# Patient Record
Sex: FEMALE | Race: WHITE | Employment: OTHER | URBAN - METROPOLITAN AREA
[De-identification: names, ages, dates, MRNs, and addresses within clinical notes are randomized per-mention and may not be internally consistent; named-entity substitution may affect disease eponyms.]

---

## 2017-01-12 ENCOUNTER — HOSPITAL ENCOUNTER (OUTPATIENT)
Dept: INFUSION THERAPY | Age: 67
Discharge: HOME | End: 2017-01-12
Payer: COMMERCIAL

## 2017-01-12 VITALS
RESPIRATION RATE: 16 BRPM | DIASTOLIC BLOOD PRESSURE: 72 MMHG | SYSTOLIC BLOOD PRESSURE: 118 MMHG | HEART RATE: 72 BPM | TEMPERATURE: 97.8 F

## 2017-01-12 PROCEDURE — 74011250636 HC RX REV CODE- 250/636

## 2017-01-12 RX ADMIN — OMALIZUMAB 150 MG: 202.5 INJECTION, SOLUTION SUBCUTANEOUS at 16:10

## 2017-01-12 NOTE — PROGRESS NOTES
[facility] Outpatient Infusion Center Note:  1600Pt arrived at [facility] ambulatory and in no distress for xolair    Assessment stable, no new complaints voiced. Pt has epi pen. Pt states she is feeling well. Medications received:  xolzir    1640 Tolerated treatment well, no adverse reaction noted. D/Cd from [facility] ambulatory and in no distress accompanied by self. Next appt 1/26  1600  Visit Vitals    /72    Pulse 72    Temp 97.8 °F (36.6 °C)    Resp 16     No results found for this or any previous visit (from the past 12 hour(s)).

## 2017-01-26 ENCOUNTER — HOSPITAL ENCOUNTER (OUTPATIENT)
Dept: INFUSION THERAPY | Age: 67
Discharge: HOME | End: 2017-01-26
Payer: COMMERCIAL

## 2017-01-26 VITALS
HEART RATE: 67 BPM | OXYGEN SATURATION: 95 % | DIASTOLIC BLOOD PRESSURE: 61 MMHG | TEMPERATURE: 98.9 F | SYSTOLIC BLOOD PRESSURE: 121 MMHG | RESPIRATION RATE: 16 BRPM

## 2017-01-26 PROCEDURE — 74011250636 HC RX REV CODE- 250/636

## 2017-01-26 PROCEDURE — 96401 CHEMO ANTI-NEOPL SQ/IM: CPT

## 2017-01-26 RX ADMIN — OMALIZUMAB 150 MG: 202.5 INJECTION, SOLUTION SUBCUTANEOUS at 16:31

## 2017-01-26 NOTE — PROGRESS NOTES
Outpatient Infusion Center Progress Note    9921 Pt admit to [facility] for Xolair injections. Pt ambulatory in stable condition. Assessment completed. No new concerns voiced. Visit Vitals    /61    Pulse 67    Temp 98.9 °F (37.2 °C)    Resp 16    SpO2 95%    Breastfeeding No       Medications:  Xolair-SQ-abdomen    1650 Pt tolerated treatment well. D/c home ambulatory in no distress. Pt aware of next appointment scheduled for 02/09/17.

## 2017-02-02 ENCOUNTER — HOSPITAL ENCOUNTER (OUTPATIENT)
Dept: MRI IMAGING | Age: 67
Discharge: HOME | End: 2017-02-02
Attending: SPECIALIST
Payer: COMMERCIAL

## 2017-02-02 DIAGNOSIS — K22.4 MOTILITY DISORDER, ESOPHAGEAL: ICD-10-CM

## 2017-02-02 DIAGNOSIS — Z86.010 PERSONAL HISTORY OF COLONIC POLYPS: ICD-10-CM

## 2017-02-02 DIAGNOSIS — Z80.0 FAMILY HISTORY OF MALIGNANT NEOPLASM OF GASTROINTESTINAL TRACT: ICD-10-CM

## 2017-02-02 DIAGNOSIS — K59.00 CONSTIPATION: ICD-10-CM

## 2017-02-02 DIAGNOSIS — R15.0 INCOMPLETE DEFECATION: ICD-10-CM

## 2017-02-02 PROCEDURE — 72195 MRI PELVIS W/O DYE: CPT

## 2017-02-09 ENCOUNTER — HOSPITAL ENCOUNTER (OUTPATIENT)
Dept: INFUSION THERAPY | Age: 67
Discharge: HOME | End: 2017-02-09
Payer: COMMERCIAL

## 2017-02-09 VITALS
DIASTOLIC BLOOD PRESSURE: 77 MMHG | SYSTOLIC BLOOD PRESSURE: 132 MMHG | HEART RATE: 68 BPM | RESPIRATION RATE: 16 BRPM | TEMPERATURE: 97.9 F

## 2017-02-09 PROCEDURE — 74011250636 HC RX REV CODE- 250/636

## 2017-02-09 PROCEDURE — 96401 CHEMO ANTI-NEOPL SQ/IM: CPT

## 2017-02-09 RX ADMIN — OMALIZUMAB 150 MG: 202.5 INJECTION, SOLUTION SUBCUTANEOUS at 17:02

## 2017-02-09 NOTE — PROGRESS NOTES
Outpatient Infusion Center - Chemotherapy Progress Note    1630 Pt admit to [facility] for [address] ambulatory in stable condition. Assessment completed. No new concerns voiced. Visit Vitals    /77    Pulse 68    Temp 97.9 °F (36.6 °C)    Resp 16       Medications:  Xolair (SQ x3 syringes, abd)    1730 Pt tolerated treatment well. D/c home ambulatory in no distress. Pt aware of next [facility] appointment scheduled for 2/23/2017.

## 2017-02-23 ENCOUNTER — HOSPITAL ENCOUNTER (OUTPATIENT)
Dept: INFUSION THERAPY | Age: 67
Discharge: HOME | End: 2017-02-23
Payer: COMMERCIAL

## 2017-02-23 VITALS
RESPIRATION RATE: 18 BRPM | TEMPERATURE: 98.3 F | DIASTOLIC BLOOD PRESSURE: 68 MMHG | HEART RATE: 64 BPM | SYSTOLIC BLOOD PRESSURE: 115 MMHG

## 2017-02-23 PROCEDURE — 74011250636 HC RX REV CODE- 250/636

## 2017-02-23 PROCEDURE — 96401 CHEMO ANTI-NEOPL SQ/IM: CPT

## 2017-02-23 RX ADMIN — OMALIZUMAB 150 MG: 202.5 INJECTION, SOLUTION SUBCUTANEOUS at 16:19

## 2017-02-23 RX ADMIN — OMALIZUMAB 150 MG: 202.5 INJECTION, SOLUTION SUBCUTANEOUS at 16:20

## 2017-02-23 RX ADMIN — OMALIZUMAB 150 MG: 202.5 INJECTION, SOLUTION SUBCUTANEOUS at 17:02

## 2017-02-23 NOTE — PROGRESS NOTES
[facility] Outpatient Infusion Center Note:  1500Pt arrived at [facility] ambulatory and in no distress for   q 2 week xolair  Assessment stable, no new complaints voiced. Medications received:  xolair 450mg     Tolerated treatment well, no adverse reaction noted. D/Cd from [facility] ambulatory and in no distress accompanied by self. .  Next appt 3/9  1500  Visit Vitals    /68    Pulse 64    Temp 98.3 °F (36.8 °C)    Resp 18     No results found for this or any previous visit (from the past 12 hour(s)).

## 2017-03-09 ENCOUNTER — HOSPITAL ENCOUNTER (OUTPATIENT)
Dept: INFUSION THERAPY | Age: 67
Discharge: HOME | End: 2017-03-09
Payer: COMMERCIAL

## 2017-03-23 ENCOUNTER — HOSPITAL ENCOUNTER (OUTPATIENT)
Dept: INFUSION THERAPY | Age: 67
Discharge: HOME | End: 2017-03-23
Payer: COMMERCIAL

## 2017-03-23 VITALS
OXYGEN SATURATION: 96 % | TEMPERATURE: 97.2 F | HEART RATE: 72 BPM | SYSTOLIC BLOOD PRESSURE: 119 MMHG | DIASTOLIC BLOOD PRESSURE: 65 MMHG | RESPIRATION RATE: 18 BRPM

## 2017-03-23 PROCEDURE — 96401 CHEMO ANTI-NEOPL SQ/IM: CPT

## 2017-03-23 PROCEDURE — 74011250636 HC RX REV CODE- 250/636

## 2017-03-23 RX ADMIN — OMALIZUMAB 150 MG: 202.5 INJECTION, SOLUTION SUBCUTANEOUS at 14:42

## 2017-03-23 NOTE — PROGRESS NOTES
Outpatient Infusion Center Short Visit Progress Note    9752 Pt admit to [address] [place] [address] for q 2 week Xolair injections ambulatory in stable condition. Assessment completed. No new concerns voiced. Visit Vitals    /65 (BP 1 Location: Left arm, BP Patient Position: Sitting)    Pulse 72    Temp 97.2 °F (36.2 °C)    Resp 18    SpO2 96%    Breastfeeding No       Medications:  Xolair SQ x3 injections to abdomen    1505 Pt tolerated treatment well. D/c home ambulatory in no distress. Pt aware of next appointment scheduled for 4/6/17 at 3:00 for q 2 week Xolair injections.

## 2017-04-06 ENCOUNTER — HOSPITAL ENCOUNTER (OUTPATIENT)
Dept: INFUSION THERAPY | Age: 67
Discharge: HOME | End: 2017-04-06
Payer: COMMERCIAL

## 2017-04-06 VITALS
SYSTOLIC BLOOD PRESSURE: 118 MMHG | OXYGEN SATURATION: 94 % | HEART RATE: 74 BPM | RESPIRATION RATE: 16 BRPM | DIASTOLIC BLOOD PRESSURE: 64 MMHG | TEMPERATURE: 98.4 F

## 2017-04-06 PROCEDURE — 96401 CHEMO ANTI-NEOPL SQ/IM: CPT

## 2017-04-06 PROCEDURE — 74011250636 HC RX REV CODE- 250/636

## 2017-04-06 RX ADMIN — OMALIZUMAB 150 MG: 202.5 INJECTION, SOLUTION SUBCUTANEOUS at 15:37

## 2017-04-06 RX ADMIN — OMALIZUMAB 150 MG: 202.5 INJECTION, SOLUTION SUBCUTANEOUS at 15:39

## 2017-04-06 RX ADMIN — OMALIZUMAB 150 MG: 202.5 INJECTION, SOLUTION SUBCUTANEOUS at 15:40

## 2017-04-20 ENCOUNTER — HOSPITAL ENCOUNTER (OUTPATIENT)
Dept: INFUSION THERAPY | Age: 67
Discharge: HOME | End: 2017-04-20
Payer: COMMERCIAL

## 2017-04-20 VITALS
SYSTOLIC BLOOD PRESSURE: 110 MMHG | DIASTOLIC BLOOD PRESSURE: 62 MMHG | OXYGEN SATURATION: 96 % | RESPIRATION RATE: 16 BRPM | TEMPERATURE: 97.8 F | HEART RATE: 72 BPM

## 2017-04-20 PROCEDURE — 96401 CHEMO ANTI-NEOPL SQ/IM: CPT

## 2017-04-20 PROCEDURE — 74011250636 HC RX REV CODE- 250/636

## 2017-04-20 RX ADMIN — OMALIZUMAB 150 MG: 202.5 INJECTION, SOLUTION SUBCUTANEOUS at 16:25

## 2017-04-20 NOTE — PROGRESS NOTES
1510 Pt admit to [facility] for Q 2 week Xolair injections ambulatory in stable condition. Assessment completed. Continues to have ESPINAL and chronic back pain. No new concerns voiced. Epi pen present with patient. Visit Vitals    /62 (BP 1 Location: Right arm, BP Patient Position: Sitting)    Pulse 72    Temp 97.8 °F (36.6 °C)    Resp 16    SpO2 96%       Medications:  Xolair 450 MG SQ in 3 divided abdominal injections    1650 Pt tolerated treatment well, monitored X 20 minutes post injection. D/c home ambulatory in no distress. Pt aware of next appointment scheduled for 5/4/17.

## 2017-05-04 ENCOUNTER — HOSPITAL ENCOUNTER (OUTPATIENT)
Dept: INFUSION THERAPY | Age: 67
Discharge: HOME | End: 2017-05-04
Payer: COMMERCIAL

## 2017-05-04 VITALS
SYSTOLIC BLOOD PRESSURE: 101 MMHG | TEMPERATURE: 98 F | RESPIRATION RATE: 16 BRPM | DIASTOLIC BLOOD PRESSURE: 63 MMHG | HEART RATE: 74 BPM

## 2017-05-04 PROCEDURE — 74011250636 HC RX REV CODE- 250/636

## 2017-05-04 PROCEDURE — 96401 CHEMO ANTI-NEOPL SQ/IM: CPT

## 2017-05-04 RX ADMIN — OMALIZUMAB 150 MG: 202.5 INJECTION, SOLUTION SUBCUTANEOUS at 16:27

## 2017-05-04 NOTE — PROGRESS NOTES
Outpatient Infusion Center Short Visit Progress Note    4970 Pt admit to [facility] for Xolair ambulatory in stable condition. Assessment completed. No new concerns voiced. Patient has epi pen with them. Visit Vitals    /63    Pulse 74    Temp 98 °F (36.7 °C)    Resp 16    Breastfeeding No         Medications:  Xolair SQx3 abd     1650 Pt tolerated treatment well. D/c home ambulatory in no distress. Patient stayed 20 minutes to be monitored. Pt aware of next appointment scheduled for 5/8/17.

## 2017-05-18 ENCOUNTER — HOSPITAL ENCOUNTER (OUTPATIENT)
Dept: INFUSION THERAPY | Age: 67
Discharge: HOME | End: 2017-05-18
Payer: COMMERCIAL

## 2017-05-18 VITALS
OXYGEN SATURATION: 97 % | SYSTOLIC BLOOD PRESSURE: 112 MMHG | RESPIRATION RATE: 18 BRPM | HEART RATE: 71 BPM | DIASTOLIC BLOOD PRESSURE: 70 MMHG | TEMPERATURE: 98.1 F

## 2017-05-18 PROCEDURE — 96372 THER/PROPH/DIAG INJ SC/IM: CPT

## 2017-05-18 PROCEDURE — 74011250636 HC RX REV CODE- 250/636

## 2017-05-18 RX ADMIN — OMALIZUMAB 150 MG: 202.5 INJECTION, SOLUTION SUBCUTANEOUS at 16:06

## 2017-05-18 NOTE — PROGRESS NOTES
Outpatient Infusion Center Short Visit Progress Note    3066 Pt admit to [facility] for Xolair ambulatory in stable condition. Assessment completed. No new concerns voiced. Patient Vitals for the past 12 hrs:   Temp Pulse Resp BP SpO2   05/18/17 1524 98.1 °F (36.7 °C) 71 18 112/70 97 %       Medications:  Xolair SQ x 3 abdomen    1610 Pt tolerated treatment well. D/c home ambulatory in no distress.  Pt aware of next appointment scheduled for 6/1/17

## 2017-06-01 ENCOUNTER — HOSPITAL ENCOUNTER (OUTPATIENT)
Dept: INFUSION THERAPY | Age: 67
Discharge: HOME | End: 2017-06-01
Payer: COMMERCIAL

## 2017-06-01 VITALS
TEMPERATURE: 98 F | DIASTOLIC BLOOD PRESSURE: 56 MMHG | RESPIRATION RATE: 18 BRPM | SYSTOLIC BLOOD PRESSURE: 111 MMHG | HEART RATE: 64 BPM

## 2017-06-01 PROCEDURE — 74011250636 HC RX REV CODE- 250/636

## 2017-06-01 PROCEDURE — 96372 THER/PROPH/DIAG INJ SC/IM: CPT

## 2017-06-01 RX ADMIN — OMALIZUMAB 150 MG: 202.5 INJECTION, SOLUTION SUBCUTANEOUS at 16:25

## 2017-06-01 RX ADMIN — OMALIZUMAB 150 MG: 202.5 INJECTION, SOLUTION SUBCUTANEOUS at 16:24

## 2017-06-01 NOTE — PROGRESS NOTES
Outpatient Infusion Center Short Visit Progress Note    1460 Pt admit to [facility] for Arnol Pacheco ambulatory in stable condition. Assessment completed. No new concerns voiced. Visit Vitals    /56    Pulse 64    Temp 98 °F (36.7 °C)    Resp 18       Medications:  Xolair SQ abd    1645 Pt tolerated treatment well. Pt monitored for 15 min post injection. D/c home ambulatory in no distress. Pt aware of next appointment scheduled for 6/15/17.

## 2017-06-15 ENCOUNTER — HOSPITAL ENCOUNTER (OUTPATIENT)
Dept: INFUSION THERAPY | Age: 67
Discharge: HOME | End: 2017-06-15
Payer: COMMERCIAL

## 2017-06-15 VITALS
DIASTOLIC BLOOD PRESSURE: 69 MMHG | HEART RATE: 70 BPM | SYSTOLIC BLOOD PRESSURE: 117 MMHG | RESPIRATION RATE: 18 BRPM | TEMPERATURE: 96.8 F

## 2017-06-15 PROCEDURE — 74011250636 HC RX REV CODE- 250/636

## 2017-06-15 PROCEDURE — 96372 THER/PROPH/DIAG INJ SC/IM: CPT

## 2017-06-15 RX ADMIN — OMALIZUMAB 150 MG: 202.5 INJECTION, SOLUTION SUBCUTANEOUS at 16:26

## 2017-06-15 NOTE — PROGRESS NOTES
Outpatient Infusion Center Progress Note    1520 Pt admit to [facility] for [address] [facility] ambulatory in stable condition. Assessment completed. No new concerns voiced. Visit Vitals    /69 (BP 1 Location: Left arm, BP Patient Position: Sitting)    Pulse 70    Temp 96.8 °F (36 °C)    Resp 18    Breastfeeding No       Medications:  Xolair 150 MG SQ abdominal tissue  Xolair 150 MG SQ abdominal tissue  Xolair 150 MG SQ abdominal tissue    1700 Pt tolerated treatment well. D/c home ambulatory in no distress. Pt aware of next appointment scheduled for 06/29/17.

## 2017-06-29 ENCOUNTER — HOSPITAL ENCOUNTER (OUTPATIENT)
Dept: INFUSION THERAPY | Age: 67
Discharge: HOME | End: 2017-06-29
Payer: COMMERCIAL

## 2017-06-29 VITALS
TEMPERATURE: 97.2 F | DIASTOLIC BLOOD PRESSURE: 72 MMHG | SYSTOLIC BLOOD PRESSURE: 120 MMHG | RESPIRATION RATE: 18 BRPM | HEART RATE: 66 BPM

## 2017-06-29 PROCEDURE — 96372 THER/PROPH/DIAG INJ SC/IM: CPT

## 2017-06-29 PROCEDURE — 74011250636 HC RX REV CODE- 250/636

## 2017-06-29 RX ADMIN — OMALIZUMAB 150 MG: 202.5 INJECTION, SOLUTION SUBCUTANEOUS at 16:09

## 2017-06-29 NOTE — PROGRESS NOTES
Outpatient Infusion Center Short Visit Progress Note    9980 Pt admit to [facility] for [address] ambulatory in stable condition. Assessment completed. No new concerns voiced. Patient Vitals for the past 12 hrs:   Temp Pulse Resp BP   06/29/17 1509 97.2 °F (36.2 °C) 66 18 120/72       Medications:  Xolair x3 SQ abd    1630 Pt tolerated treatment well. D/c home ambulatory in no distress. Pt aware of next appointment scheduled for 7/13/17.

## 2017-07-13 ENCOUNTER — HOSPITAL ENCOUNTER (OUTPATIENT)
Dept: INFUSION THERAPY | Age: 67
Discharge: HOME | End: 2017-07-13
Payer: COMMERCIAL

## 2017-07-13 VITALS
HEART RATE: 89 BPM | OXYGEN SATURATION: 97 % | TEMPERATURE: 97 F | RESPIRATION RATE: 18 BRPM | DIASTOLIC BLOOD PRESSURE: 63 MMHG | SYSTOLIC BLOOD PRESSURE: 106 MMHG

## 2017-07-13 PROCEDURE — 96372 THER/PROPH/DIAG INJ SC/IM: CPT

## 2017-07-13 PROCEDURE — 74011250636 HC RX REV CODE- 250/636

## 2017-07-13 RX ADMIN — OMALIZUMAB 150 MG: 202.5 INJECTION, SOLUTION SUBCUTANEOUS at 15:59

## 2017-07-13 NOTE — PROGRESS NOTES
Outpatient Infusion Center Short Visit Progress Note     1510 Pt admit to [facility] for Xolair ambulatory in stable condition. Assessment completed. No new concerns voiced.      Patient Vitals for the past 12 hrs:   Temp Pulse Resp BP SpO2   07/13/17 1538 97 °F (36.1 °C) 89 18 106/63 97 %        Medications:  Xolair x3 SQ abd    1605 Pt tolerated treatment well. Pt refused to stay for observation period. D/c home ambulatory in no distress. Pt aware of next appointment scheduled for 7/27/17.

## 2017-07-27 ENCOUNTER — HOSPITAL ENCOUNTER (OUTPATIENT)
Dept: INFUSION THERAPY | Age: 67
Discharge: HOME | End: 2017-07-27
Payer: COMMERCIAL

## 2017-07-27 VITALS
DIASTOLIC BLOOD PRESSURE: 65 MMHG | RESPIRATION RATE: 18 BRPM | HEART RATE: 65 BPM | OXYGEN SATURATION: 98 % | SYSTOLIC BLOOD PRESSURE: 119 MMHG | TEMPERATURE: 96.9 F

## 2017-07-27 PROCEDURE — 74011250636 HC RX REV CODE- 250/636

## 2017-07-27 PROCEDURE — 96372 THER/PROPH/DIAG INJ SC/IM: CPT

## 2017-07-27 RX ADMIN — OMALIZUMAB 150 MG: 202.5 INJECTION, SOLUTION SUBCUTANEOUS at 16:24

## 2017-07-27 NOTE — PROGRESS NOTES
Outpatient Infusion Center Short Visit Progress Note      1500 Pt admit to [facility] for Xolair ambulatory in stable condition. Assessment completed. No new concerns voiced.       Patient Vitals for the past 12 hrs:   Temp Pulse Resp BP SpO2   07/27/17 1510 96.9 °F (36.1 °C) 65 18 119/65 98 %     Medications:  Xolair x3 SQ abd      1650 Pt tolerated treatment well. Pt refused to stay for observation period. D/c home ambulatory in no distress. Pt aware of next appointment scheduled for /17.

## 2017-08-10 ENCOUNTER — HOSPITAL ENCOUNTER (OUTPATIENT)
Dept: INFUSION THERAPY | Age: 67
Discharge: HOME | End: 2017-08-10
Payer: COMMERCIAL

## 2017-08-10 VITALS
HEART RATE: 73 BPM | RESPIRATION RATE: 16 BRPM | DIASTOLIC BLOOD PRESSURE: 68 MMHG | SYSTOLIC BLOOD PRESSURE: 100 MMHG | TEMPERATURE: 98.2 F

## 2017-08-10 PROCEDURE — 74011250636 HC RX REV CODE- 250/636

## 2017-08-10 PROCEDURE — 96372 THER/PROPH/DIAG INJ SC/IM: CPT

## 2017-08-10 RX ADMIN — OMALIZUMAB 150 MG: 202.5 INJECTION, SOLUTION SUBCUTANEOUS at 16:46

## 2017-08-10 RX ADMIN — OMALIZUMAB 150 MG: 202.5 INJECTION, SOLUTION SUBCUTANEOUS at 16:45

## 2017-08-10 NOTE — PROGRESS NOTES
Outpatient Infusion Center Short Visit Progress Note    3672 Pt admit to [facility] for [address] ambulatory in stable condition. Assessment completed. No new concerns voiced. Patient Vitals for the past 12 hrs:   Temp Pulse Resp BP   08/10/17 1535 98.2 °F (36.8 °C) 73 16 100/68     Medications:  Xolair SQ x3 abd    1710 Pt tolerated treatment well. Patient monitored for 20 min following injection. No sign of reaction. D/c home ambulatory in no distress. Pt aware of next appointment scheduled for 8/24/17.

## 2017-08-24 ENCOUNTER — HOSPITAL ENCOUNTER (OUTPATIENT)
Dept: INFUSION THERAPY | Age: 67
Discharge: HOME | End: 2017-08-24
Payer: COMMERCIAL

## 2017-08-24 VITALS
SYSTOLIC BLOOD PRESSURE: 112 MMHG | DIASTOLIC BLOOD PRESSURE: 63 MMHG | HEART RATE: 64 BPM | TEMPERATURE: 97 F | RESPIRATION RATE: 16 BRPM

## 2017-08-24 PROCEDURE — 96372 THER/PROPH/DIAG INJ SC/IM: CPT

## 2017-08-24 NOTE — PROGRESS NOTES
Outpatient Infusion Center Short Visit Progress Note    9748 Pt admit to [facility] for Xolair ambulatory in stable condition. Assessment completed. No new concerns voiced. Patient Vitals for the past 12 hrs:   Temp Pulse Resp BP   08/24/17 1545 97 °F (36.1 °C) 64 16 112/63     Medications:  Xolair SQ x3 abdomen    1615 Pt tolerated treatment well. D/c home ambulatory in no distress. Pt aware of next appointment scheduled for 9/19/17 at [facility].

## 2017-09-07 ENCOUNTER — APPOINTMENT (OUTPATIENT)
Dept: INFUSION THERAPY | Age: 67
End: 2017-09-07
Payer: MEDICARE

## 2017-09-19 ENCOUNTER — HOSPITAL ENCOUNTER (OUTPATIENT)
Dept: INFUSION THERAPY | Age: 67
Discharge: HOME | End: 2017-09-19
Payer: COMMERCIAL

## 2017-09-19 VITALS
WEIGHT: 177.6 LBS | TEMPERATURE: 96.8 F | DIASTOLIC BLOOD PRESSURE: 73 MMHG | BODY MASS INDEX: 30.01 KG/M2 | RESPIRATION RATE: 18 BRPM | SYSTOLIC BLOOD PRESSURE: 124 MMHG | HEART RATE: 65 BPM

## 2017-09-19 PROCEDURE — 96372 THER/PROPH/DIAG INJ SC/IM: CPT

## 2017-09-19 PROCEDURE — 74011250636 HC RX REV CODE- 250/636

## 2017-09-19 RX ADMIN — OMALIZUMAB 150 MG: 202.5 INJECTION, SOLUTION SUBCUTANEOUS at 09:53

## 2017-09-21 ENCOUNTER — APPOINTMENT (OUTPATIENT)
Dept: INFUSION THERAPY | Age: 67
End: 2017-09-21
Payer: MEDICARE

## 2017-10-03 ENCOUNTER — HOSPITAL ENCOUNTER (OUTPATIENT)
Dept: INFUSION THERAPY | Age: 67
Discharge: HOME | End: 2017-10-03
Payer: MEDICARE

## 2017-10-03 VITALS
TEMPERATURE: 97.3 F | HEART RATE: 69 BPM | RESPIRATION RATE: 16 BRPM | SYSTOLIC BLOOD PRESSURE: 112 MMHG | DIASTOLIC BLOOD PRESSURE: 62 MMHG

## 2017-10-03 PROCEDURE — 96372 THER/PROPH/DIAG INJ SC/IM: CPT

## 2017-10-03 PROCEDURE — 74011250636 HC RX REV CODE- 250/636

## 2017-10-03 RX ADMIN — OMALIZUMAB 150 MG: 202.5 INJECTION, SOLUTION SUBCUTANEOUS at 09:51

## 2017-10-03 RX ADMIN — OMALIZUMAB 150 MG: 202.5 INJECTION, SOLUTION SUBCUTANEOUS at 09:52

## 2017-10-03 NOTE — PROGRESS NOTES
Outpatient Infusion Center Progress Note    8424 Pt admit to [facility] for Xolair ambulatory in stable condition. Assessment completed. No new concerns voiced. Pt has epipen with her today. Visit Vitals    /62    Pulse 69    Temp 97.3 °F (36.3 °C)    Resp 16       Medications:  Xolair SQ abd x3    1020 Pt tolerated treatment well. Pt monitored for 20 minutes post injections. D/c home ambulatory in no distress. Pt aware of next appointment scheduled for 10/17/17.

## 2017-10-05 ENCOUNTER — APPOINTMENT (OUTPATIENT)
Dept: INFUSION THERAPY | Age: 67
End: 2017-10-05
Payer: COMMERCIAL

## 2017-10-17 ENCOUNTER — HOSPITAL ENCOUNTER (OUTPATIENT)
Dept: INFUSION THERAPY | Age: 67
Discharge: HOME | End: 2017-10-17
Payer: MEDICARE

## 2017-10-17 NOTE — PROGRESS NOTES
[facility] Progress Note:     Patient did not arrive for Xolair today.      Chris Tompkins RN  10/17/17

## 2017-10-19 ENCOUNTER — APPOINTMENT (OUTPATIENT)
Dept: INFUSION THERAPY | Age: 67
End: 2017-10-19
Payer: COMMERCIAL

## 2017-10-31 ENCOUNTER — HOSPITAL ENCOUNTER (OUTPATIENT)
Dept: INFUSION THERAPY | Age: 67
Discharge: HOME | End: 2017-10-31
Payer: MEDICARE

## 2025-01-22 ENCOUNTER — HOSPITAL ENCOUNTER (OUTPATIENT)
Facility: HOSPITAL | Age: 75
Setting detail: OUTPATIENT SURGERY
Discharge: HOME | End: 2025-01-22
Attending: INTERNAL MEDICINE | Admitting: INTERNAL MEDICINE
Payer: MEDICARE

## 2025-01-22 ENCOUNTER — ANESTHESIA EVENT (OUTPATIENT)
Facility: HOSPITAL | Age: 75
End: 2025-01-22
Payer: MEDICARE

## 2025-01-22 ENCOUNTER — ANESTHESIA (OUTPATIENT)
Facility: HOSPITAL | Age: 75
End: 2025-01-22
Payer: MEDICARE

## 2025-01-22 VITALS
RESPIRATION RATE: 15 BRPM | TEMPERATURE: 98 F | HEIGHT: 63 IN | OXYGEN SATURATION: 100 % | BODY MASS INDEX: 31.02 KG/M2 | SYSTOLIC BLOOD PRESSURE: 115 MMHG | DIASTOLIC BLOOD PRESSURE: 52 MMHG | HEART RATE: 59 BPM | WEIGHT: 175.1 LBS

## 2025-01-22 PROCEDURE — 2720000010 HC SURG SUPPLY STERILE: Performed by: INTERNAL MEDICINE

## 2025-01-22 PROCEDURE — 3700000000 HC ANESTHESIA ATTENDED CARE: Performed by: INTERNAL MEDICINE

## 2025-01-22 PROCEDURE — 7100000011 HC PHASE II RECOVERY - ADDTL 15 MIN: Performed by: INTERNAL MEDICINE

## 2025-01-22 PROCEDURE — 2580000003 HC RX 258: Performed by: NURSE ANESTHETIST, CERTIFIED REGISTERED

## 2025-01-22 PROCEDURE — 88305 TISSUE EXAM BY PATHOLOGIST: CPT

## 2025-01-22 PROCEDURE — 3600007502: Performed by: INTERNAL MEDICINE

## 2025-01-22 PROCEDURE — 2709999900 HC NON-CHARGEABLE SUPPLY: Performed by: INTERNAL MEDICINE

## 2025-01-22 PROCEDURE — 7100000010 HC PHASE II RECOVERY - FIRST 15 MIN: Performed by: INTERNAL MEDICINE

## 2025-01-22 PROCEDURE — 6360000002 HC RX W HCPCS: Performed by: NURSE ANESTHETIST, CERTIFIED REGISTERED

## 2025-01-22 RX ORDER — SODIUM CHLORIDE 9 MG/ML
INJECTION, SOLUTION INTRAVENOUS CONTINUOUS
Status: DISCONTINUED | OUTPATIENT
Start: 2025-01-22 | End: 2025-01-22 | Stop reason: HOSPADM

## 2025-01-22 RX ORDER — OMEPRAZOLE 40 MG/1
40 CAPSULE, DELAYED RELEASE ORAL DAILY
COMMUNITY

## 2025-01-22 RX ORDER — LEVOTHYROXINE SODIUM 25 MCG
TABLET ORAL
Status: ON HOLD | COMMUNITY
End: 2025-01-22

## 2025-01-22 RX ORDER — FENOFIBRATE 145 MG/1
TABLET, COATED ORAL
COMMUNITY

## 2025-01-22 RX ORDER — MONTELUKAST SODIUM 10 MG/1
10 TABLET ORAL NIGHTLY
COMMUNITY

## 2025-01-22 RX ORDER — ESTRADIOL 0.07 MG/D
FILM, EXTENDED RELEASE TRANSDERMAL
COMMUNITY

## 2025-01-22 RX ORDER — FLECAINIDE ACETATE 100 MG/1
TABLET ORAL
COMMUNITY

## 2025-01-22 RX ORDER — GABAPENTIN 300 MG/1
CAPSULE ORAL
COMMUNITY
Start: 2024-03-26

## 2025-01-22 RX ORDER — OMEGA-3 FATTY ACIDS/FISH OIL 300-1000MG
CAPSULE ORAL
COMMUNITY

## 2025-01-22 RX ORDER — SODIUM CHLORIDE 0.9 % (FLUSH) 0.9 %
5-40 SYRINGE (ML) INJECTION EVERY 12 HOURS SCHEDULED
Status: DISCONTINUED | OUTPATIENT
Start: 2025-01-22 | End: 2025-01-22 | Stop reason: HOSPADM

## 2025-01-22 RX ORDER — FLUCONAZOLE 150 MG/1
TABLET ORAL
COMMUNITY

## 2025-01-22 RX ORDER — FOLIC ACID 1 MG/1
TABLET ORAL
COMMUNITY

## 2025-01-22 RX ORDER — SUMATRIPTAN 50 MG/1
50 TABLET, FILM COATED ORAL
COMMUNITY

## 2025-01-22 RX ORDER — GLIMEPIRIDE 2 MG/1
2 TABLET ORAL DAILY
COMMUNITY
Start: 2024-12-18

## 2025-01-22 RX ORDER — LIDOCAINE HYDROCHLORIDE 20 MG/ML
INJECTION, SOLUTION EPIDURAL; INFILTRATION; INTRACAUDAL; PERINEURAL
Status: DISCONTINUED | OUTPATIENT
Start: 2025-01-22 | End: 2025-01-22 | Stop reason: SDUPTHER

## 2025-01-22 RX ORDER — SODIUM CHLORIDE 0.9 % (FLUSH) 0.9 %
5-40 SYRINGE (ML) INJECTION PRN
Status: DISCONTINUED | OUTPATIENT
Start: 2025-01-22 | End: 2025-01-22 | Stop reason: HOSPADM

## 2025-01-22 RX ORDER — VERAPAMIL HYDROCHLORIDE 120 MG/1
180 TABLET ORAL 3 TIMES DAILY
COMMUNITY

## 2025-01-22 RX ORDER — TRIAMTERENE AND HYDROCHLOROTHIAZIDE 37.5; 25 MG/1; MG/1
1 CAPSULE ORAL DAILY
COMMUNITY

## 2025-01-22 RX ORDER — OMEGA-3-ACID ETHYL ESTERS 1 G/1
1 CAPSULE, LIQUID FILLED ORAL
COMMUNITY

## 2025-01-22 RX ORDER — EPINEPHRINE 0.3 MG/.3ML
0.3 INJECTION SUBCUTANEOUS
COMMUNITY

## 2025-01-22 RX ORDER — NIACIN 500 MG/1
500 TABLET, EXTENDED RELEASE ORAL NIGHTLY
COMMUNITY

## 2025-01-22 RX ORDER — MAGNESIUM GLUCONATE 27 MG(500)
500 TABLET ORAL 2 TIMES DAILY
COMMUNITY

## 2025-01-22 RX ORDER — SODIUM CHLORIDE 9 MG/ML
INJECTION, SOLUTION INTRAVENOUS
Status: DISCONTINUED | OUTPATIENT
Start: 2025-01-22 | End: 2025-01-22 | Stop reason: SDUPTHER

## 2025-01-22 RX ORDER — SODIUM CHLORIDE 9 MG/ML
INJECTION, SOLUTION INTRAVENOUS PRN
Status: DISCONTINUED | OUTPATIENT
Start: 2025-01-22 | End: 2025-01-22 | Stop reason: HOSPADM

## 2025-01-22 RX ORDER — CYCLOSPORINE 0.5 MG/ML
EMULSION OPHTHALMIC
COMMUNITY

## 2025-01-22 RX ORDER — NARATRIPTAN 2.5 MG/1
2.5 TABLET ORAL
COMMUNITY
Start: 2024-03-26

## 2025-01-22 RX ORDER — ALBUTEROL SULFATE 1.25 MG/3ML
SOLUTION RESPIRATORY (INHALATION)
COMMUNITY

## 2025-01-22 RX ORDER — METOPROLOL TARTRATE 25 MG/1
50 TABLET, FILM COATED ORAL 2 TIMES DAILY
COMMUNITY

## 2025-01-22 RX ORDER — CHLORAL HYDRATE 500 MG
CAPSULE ORAL DAILY
Status: ON HOLD | COMMUNITY
End: 2025-01-22

## 2025-01-22 RX ORDER — BUDESONIDE 1 MG/2ML
1 INHALANT ORAL 2 TIMES DAILY
COMMUNITY

## 2025-01-22 RX ORDER — PSEUDOEPHEDRINE HCL 30 MG
100 TABLET ORAL
COMMUNITY
Start: 2024-08-19

## 2025-01-22 RX ORDER — VITAMIN B COMPLEX
CAPSULE ORAL
COMMUNITY

## 2025-01-22 RX ORDER — FAMOTIDINE 40 MG/1
TABLET, FILM COATED ORAL
COMMUNITY

## 2025-01-22 RX ORDER — RIZATRIPTAN BENZOATE 10 MG/1
10 TABLET ORAL
COMMUNITY

## 2025-01-22 RX ORDER — ESOMEPRAZOLE MAGNESIUM 40 MG/1
CAPSULE, DELAYED RELEASE ORAL
COMMUNITY

## 2025-01-22 RX ORDER — RIMEGEPANT SULFATE 75 MG/75MG
TABLET, ORALLY DISINTEGRATING ORAL
COMMUNITY

## 2025-01-22 RX ORDER — INFLIXIMAB 100 MG/10ML
INJECTION, POWDER, LYOPHILIZED, FOR SOLUTION INTRAVENOUS
COMMUNITY

## 2025-01-22 RX ORDER — METHOTREXATE 2.5 MG/1
TABLET ORAL
COMMUNITY

## 2025-01-22 RX ORDER — LEVOTHYROXINE SODIUM 25 UG/1
25 TABLET ORAL DAILY
COMMUNITY

## 2025-01-22 RX ADMIN — PROPOFOL 25 MG: 10 INJECTION, EMULSION INTRAVENOUS at 12:25

## 2025-01-22 RX ADMIN — PROPOFOL 25 MG: 10 INJECTION, EMULSION INTRAVENOUS at 12:23

## 2025-01-22 RX ADMIN — SODIUM CHLORIDE: 9 INJECTION, SOLUTION INTRAVENOUS at 12:15

## 2025-01-22 RX ADMIN — PROPOFOL 25 MG: 10 INJECTION, EMULSION INTRAVENOUS at 12:24

## 2025-01-22 RX ADMIN — PROPOFOL 100 MG: 10 INJECTION, EMULSION INTRAVENOUS at 12:22

## 2025-01-22 RX ADMIN — LIDOCAINE HYDROCHLORIDE 100 MG: 20 INJECTION, SOLUTION EPIDURAL; INFILTRATION; INTRACAUDAL; PERINEURAL at 12:22

## 2025-01-22 ASSESSMENT — PAIN - FUNCTIONAL ASSESSMENT
PAIN_FUNCTIONAL_ASSESSMENT: 0-10
PAIN_FUNCTIONAL_ASSESSMENT: 0-10

## 2025-01-22 NOTE — DISCHARGE INSTRUCTIONS
[facility]  [facility], located within [facility]  BUNNY Willis MD  [phone]      January 22, 2025     Gail Amaya  YOB: 1950    ENDOSCOPY DISCHARGE INSTRUCTIONS    If there is redness at IV site you should apply warm compress to area.  If redness or soreness persist contact Dr. Willis or your primary care doctor.    Gaseous discomfort may develop, but walking, belching will help relieve this.  You may not operate a vehicle for 12 hours  You may not operate machinery or dangerous appliances for rest of today  You may not drink alcoholic beverages for 12 hours  Avoid making any critical decisions for 24 hours    DIET:  You may resume your normal diet, but some patients find that heavy or large meals may lead to indigestion or vomiting.  I suggest a light meal as first food intake.    MEDICATIONS:  The use of some over-the-counter pain medication may lead to bleeding after biopsies or other procedures you may have had done.  Tylenol (also called acetaminophen) is safe to take and will not lead to bleeding.  Based on the procedure you had today you may safely take aspirin or aspirin-like products for the next seven (7) days.      ACTIVITY:  You may resume your normal household activities, but it is recommended that you spend the remainder of the day resting -  avoid any strenuous activity.     CALL DR. WILLIS'S OFFICE IF:  Increasing pain, nausea, vomiting  Abdominal distension (swelling)  Significant new or increased bleeding (oral or rectal)  Fever/Chills  Chest pain/shortness of breath                   Additional instructions:   Impression: LA class A esophagitis.  Irregular Z-line with biopsies taken.  No Staples's esophagus.  Small hiatal hernia.  Otherwise normal stomach.  Normal duodenum.           Recommendations:  Await pathology results.  Based on pathology results expect that we will discuss resuming PPI

## 2025-01-22 NOTE — INTERVAL H&P NOTE
Pre-Endoscopy H&P Update  Chief complaint/HPI/ROS:  The indication for the procedure, the patient's history and the patient's current medications are reviewed prior to the procedure and that data is reported on the H&P to which this document is attached.  Any significant complaints with regard to organ systems will be noted, and if not mentioned then a review of systems is not contributory.  No past medical history on file.   No past surgical history on file.  Social   Social History     Tobacco Use    Smoking status: Not on file    Smokeless tobacco: Not on file   Substance Use Topics    Alcohol use: Not on file      No family history on file.   Not on File   None       PHYSICAL EXAM:  The patient is examined immediately prior to the procedure.  There were no vitals filed for this visit.  Gen: Appears comfortable, no distress.  Pulm: comfortable respirations with no abnormal audible breath sounds  HEART: well perfused, no abnormal audible heart sounds  GI: abdomen flat.    PLAN:  Informed consent discussion held, patient afforded an opportunity to ask questions and all questions answered.  After being advised of the risks, benefits, and alternatives, the patient requested that we proceed and indicated so on a written consent form.      Will proceed with procedure as planned.  Oj Roque Jr, MD

## 2025-01-22 NOTE — PROGRESS NOTES
Verified patient name and date of birth, scheduled procedure, and informed consent. Reviewed general discharge instructions and  information.  Assessed patient. Awake, alert, and oriented per baseline. Vital signs stable (see vital sign flowsheet). Respiratory status within defined limits, abdomen soft and non tender. Skin with in defined limits.     Initial RN admission and assessment performed and documented in Endoscopy navigator.     Patient evaluated by anesthesia in pre-procedure holding.     All procedural vital signs, airway assessment, and level of consciousness information monitored and recorded by anesthesia staff on the anesthesia record.     Report received from CRNA post procedure.  Patient transported to recovery area by RN.    Endoscopy post procedure time out was performed and specimens were verified with physician.    Endoscope was pre-cleaned at bedside immediately following procedure by ET.

## 2025-01-22 NOTE — OP NOTE
esophagus.  Stomach: Small, 1 cm hiatal hernia.  Otherwise normal stomach.  Duodenum/jejunum: Normal.      Specimens:   ID Type Source Tests Collected by Time Destination   1 : Distal esophagus bx Tissue Esophagus SURGICAL PATHOLOGY Oj Roque MD 1/22/2025 1226         Impression: LA class A esophagitis.  Irregular Z-line with biopsies taken.  No Staples's esophagus.  Small hiatal hernia.  Otherwise normal stomach.  Normal duodenum.           Recommendations:  Await pathology results.  Based on pathology results expect that we will discuss resuming PPI therapy.  Will choose alternate medication to pantoprazole.  Can resume blood thinner tomorrow.    Thank you for entrusting me with this patient's care.  Please do not hesitate to contact me with any questions.  Signed By: Oj Roque Jr, MD                        January 22, 2025

## 2025-01-22 NOTE — ANESTHESIA POSTPROCEDURE EVALUATION
Department of Anesthesiology  Postprocedure Note    Patient: Gail Amaya  MRN: [id number]  YOB: 1950  Date of evaluation: 1/22/2025    Procedure Summary       Date: 01/22/25 Room / Location: [facility] ENDO 01 / [facility] ENDOSCOPY    Anesthesia Start: 1219 Anesthesia Stop: 1228    Procedure: ESOPHAGOGASTRODUODENOSCOPY (Upper GI Region) Diagnosis:       Gastroesophageal reflux disease, unspecified whether esophagitis present      Abnormal liver enzymes      (Gastroesophageal reflux disease, unspecified whether esophagitis present [K21.9])      (Abnormal liver enzymes [R74.8])    Surgeons: Oj Roque MD Responsible Provider: Karolyn Barnes MD    Anesthesia Type: MAC ASA Status: 2            Anesthesia Type: MAC    James Phase I: James Score: 10    James Phase II: James Score: 9    Anesthesia Post Evaluation    Patient location during evaluation: bedside  Patient participation: complete - patient participated  Level of consciousness: awake and alert  Pain scale: Controlled per protocol.  Airway patency: patent  Nausea & Vomiting: no nausea and no vomiting  Cardiovascular status: hemodynamically stable  Respiratory status: acceptable  Hydration status: stable  Multimodal analgesia pain management approach  Pain management: adequate    No notable events documented.

## 2025-01-22 NOTE — ANESTHESIA PRE PROCEDURE
Department of Anesthesiology  Preprocedure Note       Name:  Gail Amaya   Age:  74 y.o.  :  1950                                          MRN:  [id number]         Date:  2025      Surgeon: Surgeon(s):  Oj Roque MD    Procedure: Procedure(s):  ESOPHAGOGASTRODUODENOSCOPY    Medications prior to admission:   Prior to Admission medications    Medication Sig Start Date End Date Taking? Authorizing Provider   docusate (COLACE, DULCOLAX) 100 MG CAPS Take 100 mg by mouth 24  Yes Alma Galeana MD   gabapentin (NEURONTIN) 300 MG capsule Take by mouth. 3/26/24  Yes Alma Galeana MD   glimepiride (AMARYL) 2 MG tablet Take 1 tablet by mouth daily with food 24  Yes Alma Galeana MD   naratriptan (AMERGE) 2.5 MG tablet Take 1 tablet by mouth once as needed 3/26/24  Yes Alma Galeana MD   apixaban (ELIQUIS) 5 MG TABS tablet Take 1 tablet by mouth 2 times daily   Yes Alma Galeana MD   adalimumab (HUMIRA) 40 MG/0.4ML AJKT injection pen kit Inject 0.4 mLs into the skin every 14 days    Alma Galeana MD   albuterol (ACCUNEB) 1.25 MG/3ML nebulizer solution As directed as needed, 17g    Alma Galeana MD   B Complex CAPS Take by mouth    Alma Galeana MD   budesonide (PULMICORT) 1 MG/2ML nebulizer suspension Take 2 mLs by nebulization 2 times daily    Alma Galeana MD   Cholecalciferol 250 MCG (01475 UT) TABS 0    Alma Galeana MD   Cyanocobalamin (VITAMIN B12) 1000 MCG TBCR Take by mouth    Alma Galeana MD   cycloSPORINE (RESTASIS) 0.05 % ophthalmic emulsion Apply to eye    Alma Galeana MD   EPINEPHrine (EPIPEN) 0.3 MG/0.3ML SOAJ injection Inject 0.3 mLs into the muscle    Alma Galeana MD   esomeprazole (NEXIUM) 40 MG delayed release capsule Take by mouth    Alma Galeana MD   estradiol (VIVELLE) 0.075 MG/24HR 0    Alma Galeana MD   famotidine (PEPCID) 40 MG tablet Take by mouth 
not applicable

## 2025-01-22 NOTE — H&P
74 y.o. female presents for diagnostic upper endoscopy for GERD.  Additional H&P data will be attached on the day of procedure.    Oj Roque Jr, MD

## 2025-05-02 ENCOUNTER — APPOINTMENT (OUTPATIENT)
Facility: HOSPITAL | Age: 75
DRG: 281 | End: 2025-05-02
Payer: MEDICARE

## 2025-05-02 ENCOUNTER — HOSPITAL ENCOUNTER (INPATIENT)
Facility: HOSPITAL | Age: 75
LOS: 5 days | Discharge: ANOTHER HOSPITAL | DRG: 281 | End: 2025-05-09
Attending: EMERGENCY MEDICINE | Admitting: HOSPITALIST
Payer: MEDICARE

## 2025-05-02 DIAGNOSIS — I48.91 ATRIAL FIBRILLATION WITH RVR (HCC): Primary | ICD-10-CM

## 2025-05-02 DIAGNOSIS — I48.19 PERSISTENT ATRIAL FIBRILLATION (HCC): ICD-10-CM

## 2025-05-02 DIAGNOSIS — A41.9 SEPSIS, DUE TO UNSPECIFIED ORGANISM, UNSPECIFIED WHETHER ACUTE ORGAN DYSFUNCTION PRESENT (HCC): ICD-10-CM

## 2025-05-02 LAB
ALBUMIN SERPL-MCNC: 4 G/DL (ref 3.5–5)
ALBUMIN/GLOB SERPL: 0.9 (ref 1.1–2.2)
ALP SERPL-CCNC: 54 U/L (ref 45–117)
ALT SERPL-CCNC: 71 U/L (ref 12–78)
ANION GAP BLD CALC-SCNC: 17 (ref 10–20)
ANION GAP SERPL CALC-SCNC: 10 MMOL/L (ref 2–12)
AST SERPL-CCNC: 42 U/L (ref 15–37)
BASE EXCESS BLD CALC-SCNC: 1.1 MMOL/L
BILIRUB SERPL-MCNC: 0.4 MG/DL (ref 0.2–1)
BUN SERPL-MCNC: 15 MG/DL (ref 6–20)
BUN/CREAT SERPL: 14 (ref 12–20)
CA-I BLD-MCNC: 1.27 MMOL/L (ref 1.15–1.33)
CALCIUM SERPL-MCNC: 10.3 MG/DL (ref 8.5–10.1)
CHLORIDE BLD-SCNC: 105 MMOL/L (ref 100–111)
CHLORIDE SERPL-SCNC: 105 MMOL/L (ref 97–108)
CO2 BLD-SCNC: 21 MMOL/L (ref 22–29)
CO2 SERPL-SCNC: 23 MMOL/L (ref 21–32)
CREAT SERPL-MCNC: 1.09 MG/DL (ref 0.55–1.02)
CREAT UR-MCNC: 0.8 MG/DL (ref 0.6–1.3)
GLOBULIN SER CALC-MCNC: 4.3 G/DL (ref 2–4)
GLUCOSE BLD STRIP.AUTO-MCNC: 152 MG/DL (ref 74–99)
GLUCOSE SERPL-MCNC: 148 MG/DL (ref 65–100)
HCO3 BLDA-SCNC: 22 MMOL/L
LACTATE BLD-SCNC: 4.21 MMOL/L (ref 0.4–2)
NT PRO BNP: 30 PG/ML
PCO2 BLDV: 26.1 MMHG (ref 41–51)
PH BLDV: 7.53 (ref 7.32–7.42)
PO2 BLDV: 36 MMHG (ref 25–40)
POTASSIUM BLD-SCNC: 3.1 MMOL/L (ref 3.5–5.5)
POTASSIUM SERPL-SCNC: 3.2 MMOL/L (ref 3.5–5.1)
PROT SERPL-MCNC: 8.3 G/DL (ref 6.4–8.2)
SAO2 % BLD: 77 % (ref 94–98)
SERVICE CMNT-IMP: ABNORMAL
SODIUM BLD-SCNC: 143 MMOL/L (ref 136–145)
SODIUM SERPL-SCNC: 138 MMOL/L (ref 136–145)
SPECIMEN SITE: ABNORMAL
TROPONIN I SERPL HS-MCNC: 6 NG/L (ref 0–51)

## 2025-05-02 PROCEDURE — 83880 ASSAY OF NATRIURETIC PEPTIDE: CPT

## 2025-05-02 PROCEDURE — 96365 THER/PROPH/DIAG IV INF INIT: CPT

## 2025-05-02 PROCEDURE — 71045 X-RAY EXAM CHEST 1 VIEW: CPT

## 2025-05-02 PROCEDURE — 84295 ASSAY OF SERUM SODIUM: CPT

## 2025-05-02 PROCEDURE — 85025 COMPLETE CBC W/AUTO DIFF WBC: CPT

## 2025-05-02 PROCEDURE — 80053 COMPREHEN METABOLIC PANEL: CPT

## 2025-05-02 PROCEDURE — 93005 ELECTROCARDIOGRAM TRACING: CPT | Performed by: HOSPITALIST

## 2025-05-02 PROCEDURE — 94761 N-INVAS EAR/PLS OXIMETRY MLT: CPT

## 2025-05-02 PROCEDURE — 99285 EMERGENCY DEPT VISIT HI MDM: CPT

## 2025-05-02 PROCEDURE — 2500000003 HC RX 250 WO HCPCS: Performed by: EMERGENCY MEDICINE

## 2025-05-02 PROCEDURE — 82803 BLOOD GASES ANY COMBINATION: CPT

## 2025-05-02 PROCEDURE — 84132 ASSAY OF SERUM POTASSIUM: CPT

## 2025-05-02 PROCEDURE — 84484 ASSAY OF TROPONIN QUANT: CPT

## 2025-05-02 PROCEDURE — 93005 ELECTROCARDIOGRAM TRACING: CPT | Performed by: EMERGENCY MEDICINE

## 2025-05-02 PROCEDURE — 83735 ASSAY OF MAGNESIUM: CPT

## 2025-05-02 PROCEDURE — 36415 COLL VENOUS BLD VENIPUNCTURE: CPT

## 2025-05-02 PROCEDURE — 2580000003 HC RX 258: Performed by: EMERGENCY MEDICINE

## 2025-05-02 PROCEDURE — 82947 ASSAY GLUCOSE BLOOD QUANT: CPT

## 2025-05-02 PROCEDURE — 82330 ASSAY OF CALCIUM: CPT

## 2025-05-02 RX ORDER — FLECAINIDE ACETATE 150 MG/1
150 TABLET ORAL 2 TIMES DAILY
Status: ON HOLD | COMMUNITY
End: 2025-05-09 | Stop reason: HOSPADM

## 2025-05-02 RX ORDER — 0.9 % SODIUM CHLORIDE 0.9 %
30 INTRAVENOUS SOLUTION INTRAVENOUS ONCE
Status: COMPLETED | OUTPATIENT
Start: 2025-05-02 | End: 2025-05-03

## 2025-05-02 RX ORDER — LIDOCAINE 50 MG/G
PATCH TOPICAL
Status: ON HOLD | COMMUNITY
End: 2025-05-09 | Stop reason: HOSPADM

## 2025-05-02 RX ORDER — DILTIAZEM HYDROCHLORIDE 120 MG/1
120 CAPSULE, COATED, EXTENDED RELEASE ORAL DAILY
COMMUNITY
Start: 2025-05-01

## 2025-05-02 RX ORDER — POLYETHYLENE GLYCOL 3350 17 G/17G
17 POWDER, FOR SOLUTION ORAL DAILY
COMMUNITY

## 2025-05-02 RX ORDER — 0.9 % SODIUM CHLORIDE 0.9 %
1000 INTRAVENOUS SOLUTION INTRAVENOUS ONCE
Status: COMPLETED | OUTPATIENT
Start: 2025-05-02 | End: 2025-05-03

## 2025-05-02 RX ORDER — OMEPRAZOLE 20 MG/1
20 CAPSULE, DELAYED RELEASE ORAL DAILY
COMMUNITY

## 2025-05-02 RX ORDER — DILTIAZEM HYDROCHLORIDE 5 MG/ML
10 INJECTION INTRAVENOUS ONCE
Status: COMPLETED | OUTPATIENT
Start: 2025-05-02 | End: 2025-05-02

## 2025-05-02 RX ADMIN — SODIUM CHLORIDE 1000 ML: 0.9 INJECTION, SOLUTION INTRAVENOUS at 22:57

## 2025-05-02 RX ADMIN — SODIUM CHLORIDE 5 MG/HR: 900 INJECTION, SOLUTION INTRAVENOUS at 22:59

## 2025-05-02 RX ADMIN — DILTIAZEM HYDROCHLORIDE 10 MG: 5 INJECTION, SOLUTION INTRAVENOUS at 22:57

## 2025-05-02 ASSESSMENT — PAIN - FUNCTIONAL ASSESSMENT: PAIN_FUNCTIONAL_ASSESSMENT: NONE - DENIES PAIN

## 2025-05-02 ASSESSMENT — LIFESTYLE VARIABLES
HOW MANY STANDARD DRINKS CONTAINING ALCOHOL DO YOU HAVE ON A TYPICAL DAY: PATIENT DOES NOT DRINK
HOW OFTEN DO YOU HAVE A DRINK CONTAINING ALCOHOL: NEVER

## 2025-05-03 ENCOUNTER — APPOINTMENT (OUTPATIENT)
Facility: HOSPITAL | Age: 75
DRG: 281 | End: 2025-05-03
Attending: HOSPITALIST
Payer: MEDICARE

## 2025-05-03 PROBLEM — I48.91 ATRIAL FIBRILLATION WITH RVR (HCC): Status: ACTIVE | Noted: 2025-05-03

## 2025-05-03 LAB
ANION GAP SERPL CALC-SCNC: 5 MMOL/L (ref 2–12)
APPEARANCE UR: CLEAR
BACTERIA URNS QL MICRO: NEGATIVE /HPF
BASOPHILS # BLD: 0 K/UL (ref 0–0.1)
BASOPHILS # BLD: 0.04 K/UL (ref 0–0.1)
BASOPHILS NFR BLD: 0 % (ref 0–1)
BASOPHILS NFR BLD: 0.4 % (ref 0–1)
BILIRUB UR QL: NEGATIVE
BUN SERPL-MCNC: 10 MG/DL (ref 6–20)
BUN/CREAT SERPL: 17 (ref 12–20)
CALCIUM SERPL-MCNC: 7.2 MG/DL (ref 8.5–10.1)
CHLORIDE SERPL-SCNC: 120 MMOL/L (ref 97–108)
CO2 SERPL-SCNC: 19 MMOL/L (ref 21–32)
COLOR UR: ABNORMAL
CREAT SERPL-MCNC: 0.6 MG/DL (ref 0.55–1.02)
DIFFERENTIAL METHOD BLD: ABNORMAL
DIFFERENTIAL METHOD BLD: ABNORMAL
ECHO AO ARCH DIAM: 2.6 CM
ECHO AO ASC DIAM: 3.5 CM
ECHO AO ASCENDING AORTA INDEX: 1.9 CM/M2
ECHO AO ROOT DIAM: 3.7 CM
ECHO AO ROOT INDEX: 2.01 CM/M2
ECHO AV AREA PEAK VELOCITY: 1.9 CM2
ECHO AV AREA VTI: 1.9 CM2
ECHO AV AREA/BSA PEAK VELOCITY: 1 CM2/M2
ECHO AV AREA/BSA VTI: 1 CM2/M2
ECHO AV MEAN GRADIENT: 3 MMHG
ECHO AV MEAN VELOCITY: 0.8 M/S
ECHO AV PEAK GRADIENT: 5 MMHG
ECHO AV PEAK VELOCITY: 1.1 M/S
ECHO AV VELOCITY RATIO: 0.64
ECHO AV VTI: 25.9 CM
ECHO BSA: 1.89 M2
ECHO EST RA PRESSURE: 3 MMHG
ECHO LA DIAMETER INDEX: 1.36 CM/M2
ECHO LA DIAMETER: 2.5 CM
ECHO LA TO AORTIC ROOT RATIO: 0.68
ECHO LA VOL A-L A2C: 27 ML (ref 22–52)
ECHO LA VOL A-L A4C: 31 ML (ref 22–52)
ECHO LA VOL BP: 27 ML (ref 22–52)
ECHO LA VOL MOD A2C: 25 ML (ref 22–52)
ECHO LA VOL MOD A4C: 28 ML (ref 22–52)
ECHO LA VOL/BSA BIPLANE: 15 ML/M2 (ref 16–34)
ECHO LA VOLUME AREA LENGTH: 30 ML
ECHO LA VOLUME INDEX A-L A2C: 15 ML/M2 (ref 16–34)
ECHO LA VOLUME INDEX A-L A4C: 17 ML/M2 (ref 16–34)
ECHO LA VOLUME INDEX AREA LENGTH: 16 ML/M2 (ref 16–34)
ECHO LA VOLUME INDEX MOD A2C: 14 ML/M2 (ref 16–34)
ECHO LA VOLUME INDEX MOD A4C: 15 ML/M2 (ref 16–34)
ECHO LV E' LATERAL VELOCITY: 8.32 CM/S
ECHO LV E' SEPTAL VELOCITY: 8.32 CM/S
ECHO LV EDV A2C: 53 ML
ECHO LV EDV A4C: 52 ML
ECHO LV EDV BP: 52 ML (ref 56–104)
ECHO LV EDV INDEX A4C: 28 ML/M2
ECHO LV EDV INDEX BP: 28 ML/M2
ECHO LV EDV NDEX A2C: 29 ML/M2
ECHO LV EF PHYSICIAN: 67 %
ECHO LV EJECTION FRACTION A2C: 71 %
ECHO LV EJECTION FRACTION A4C: 65 %
ECHO LV EJECTION FRACTION BIPLANE: 67 % (ref 55–100)
ECHO LV ESV A2C: 15 ML
ECHO LV ESV A4C: 18 ML
ECHO LV ESV BP: 17 ML (ref 19–49)
ECHO LV ESV INDEX A2C: 8 ML/M2
ECHO LV ESV INDEX A4C: 10 ML/M2
ECHO LV ESV INDEX BP: 9 ML/M2
ECHO LV FRACTIONAL SHORTENING: 46 % (ref 28–44)
ECHO LV INTERNAL DIMENSION DIASTOLE INDEX: 2.12 CM/M2
ECHO LV INTERNAL DIMENSION DIASTOLIC: 3.9 CM (ref 3.9–5.3)
ECHO LV INTERNAL DIMENSION SYSTOLIC INDEX: 1.14 CM/M2
ECHO LV INTERNAL DIMENSION SYSTOLIC: 2.1 CM
ECHO LV IVSD: 0.9 CM (ref 0.6–0.9)
ECHO LV MASS 2D: 105.3 G (ref 67–162)
ECHO LV MASS INDEX 2D: 57.2 G/M2 (ref 43–95)
ECHO LV POSTERIOR WALL DIASTOLIC: 0.9 CM (ref 0.6–0.9)
ECHO LV RELATIVE WALL THICKNESS RATIO: 0.46
ECHO LVOT AREA: 3.1 CM2
ECHO LVOT AV VTI INDEX: 0.59
ECHO LVOT DIAM: 2 CM
ECHO LVOT MEAN GRADIENT: 1 MMHG
ECHO LVOT PEAK GRADIENT: 2 MMHG
ECHO LVOT PEAK VELOCITY: 0.7 M/S
ECHO LVOT STROKE VOLUME INDEX: 26.3 ML/M2
ECHO LVOT SV: 48.4 ML
ECHO LVOT VTI: 15.4 CM
ECHO MV A VELOCITY: 0.44 M/S
ECHO MV E DECELERATION TIME (DT): 246.2 MS
ECHO MV E VELOCITY: 0.56 M/S
ECHO MV E/A RATIO: 1.27
ECHO MV E/E' LATERAL: 6.73
ECHO MV E/E' RATIO (AVERAGED): 6.73
ECHO MV E/E' SEPTAL: 6.73
ECHO PULMONARY ARTERY END DIASTOLIC PRESSURE: 2 MMHG
ECHO PV MAX VELOCITY: 0.7 M/S
ECHO PV PEAK GRADIENT: 2 MMHG
ECHO PV REGURGITANT MAX VELOCITY: 0.7 M/S
ECHO RIGHT VENTRICULAR SYSTOLIC PRESSURE (RVSP): 19 MMHG
ECHO RV FREE WALL PEAK S': 9.8 CM/S
ECHO RV INTERNAL DIMENSION: 3.7 CM
ECHO RV TAPSE: 2.2 CM (ref 1.7–?)
ECHO TV REGURGITANT MAX VELOCITY: 1.98 M/S
ECHO TV REGURGITANT PEAK GRADIENT: 16 MMHG
EKG ATRIAL RATE: 150 BPM
EKG ATRIAL RATE: 241 BPM
EKG DIAGNOSIS: NORMAL
EKG Q-T INTERVAL: 290 MS
EKG Q-T INTERVAL: 306 MS
EKG Q-T INTERVAL: 414 MS
EKG QRS DURATION: 156 MS
EKG QRS DURATION: 164 MS
EKG QRS DURATION: 168 MS
EKG QTC CALCULATION (BAZETT): 468 MS
EKG QTC CALCULATION (BAZETT): 528 MS
EKG QTC CALCULATION (BAZETT): 549 MS
EKG R AXIS: -36 DEGREES
EKG R AXIS: -50 DEGREES
EKG R AXIS: -55 DEGREES
EKG T AXIS: -18 DEGREES
EKG T AXIS: 35 DEGREES
EKG T AXIS: 39 DEGREES
EKG VENTRICULAR RATE: 106 BPM
EKG VENTRICULAR RATE: 157 BPM
EKG VENTRICULAR RATE: 179 BPM
EOSINOPHIL # BLD: 0.03 K/UL (ref 0–0.4)
EOSINOPHIL # BLD: 0.23 K/UL (ref 0–0.4)
EOSINOPHIL NFR BLD: 0.3 % (ref 0–7)
EOSINOPHIL NFR BLD: 2 % (ref 0–7)
EPITH CASTS URNS QL MICRO: ABNORMAL /LPF
ERYTHROCYTE [DISTWIDTH] IN BLOOD BY AUTOMATED COUNT: 13.2 % (ref 11.5–14.5)
ERYTHROCYTE [DISTWIDTH] IN BLOOD BY AUTOMATED COUNT: 13.2 % (ref 11.5–14.5)
EST. AVERAGE GLUCOSE BLD GHB EST-MCNC: 131 MG/DL
GLUCOSE BLD STRIP.AUTO-MCNC: 174 MG/DL (ref 65–117)
GLUCOSE SERPL-MCNC: 142 MG/DL (ref 65–100)
GLUCOSE UR STRIP.AUTO-MCNC: 100 MG/DL
HBA1C MFR BLD: 6.2 % (ref 4–5.6)
HCT VFR BLD AUTO: 42.5 % (ref 35–47)
HCT VFR BLD AUTO: 44.4 % (ref 35–47)
HGB BLD-MCNC: 14.1 G/DL (ref 11.5–16)
HGB BLD-MCNC: 15.1 G/DL (ref 11.5–16)
HGB UR QL STRIP: ABNORMAL
HYALINE CASTS URNS QL MICRO: ABNORMAL /LPF (ref 0–2)
IMM GRANULOCYTES # BLD AUTO: 0 K/UL (ref 0–0.04)
IMM GRANULOCYTES # BLD AUTO: 0.05 K/UL (ref 0–0.04)
IMM GRANULOCYTES NFR BLD AUTO: 0 % (ref 0–0.5)
IMM GRANULOCYTES NFR BLD AUTO: 0.5 % (ref 0–0.5)
KETONES UR QL STRIP.AUTO: NEGATIVE MG/DL
LACTATE BLD-SCNC: 3.8 MMOL/L (ref 0.4–2)
LACTATE SERPL-SCNC: 2 MMOL/L (ref 0.4–2)
LACTATE SERPL-SCNC: 2.6 MMOL/L (ref 0.4–2)
LEUKOCYTE ESTERASE UR QL STRIP.AUTO: NEGATIVE
LYMPHOCYTES # BLD: 2.23 K/UL (ref 0.8–3.5)
LYMPHOCYTES # BLD: 6.09 K/UL (ref 0.8–3.5)
LYMPHOCYTES NFR BLD: 22.1 % (ref 12–49)
LYMPHOCYTES NFR BLD: 53 % (ref 12–49)
MAGNESIUM SERPL-MCNC: 1.8 MG/DL (ref 1.6–2.4)
MCH RBC QN AUTO: 31.6 PG (ref 26–34)
MCH RBC QN AUTO: 32.3 PG (ref 26–34)
MCHC RBC AUTO-ENTMCNC: 33.2 G/DL (ref 30–36.5)
MCHC RBC AUTO-ENTMCNC: 34 G/DL (ref 30–36.5)
MCV RBC AUTO: 92.9 FL (ref 80–99)
MCV RBC AUTO: 97.3 FL (ref 80–99)
MONOCYTES # BLD: 0.57 K/UL (ref 0–1)
MONOCYTES # BLD: 0.69 K/UL (ref 0–1)
MONOCYTES NFR BLD: 5.7 % (ref 5–13)
MONOCYTES NFR BLD: 6 % (ref 5–13)
NEUTS BAND NFR BLD MANUAL: 1 %
NEUTS SEG # BLD: 4.49 K/UL (ref 1.8–8)
NEUTS SEG # BLD: 7.16 K/UL (ref 1.8–8)
NEUTS SEG NFR BLD: 38 % (ref 32–75)
NEUTS SEG NFR BLD: 71 % (ref 32–75)
NITRITE UR QL STRIP.AUTO: NEGATIVE
NRBC # BLD: 0 K/UL (ref 0–0.01)
NRBC # BLD: 0 K/UL (ref 0–0.01)
NRBC BLD-RTO: 0 PER 100 WBC
NRBC BLD-RTO: 0 PER 100 WBC
PH UR STRIP: 6.5 (ref 5–8)
PHOSPHATE SERPL-MCNC: 1.3 MG/DL (ref 2.6–4.7)
PLATELET # BLD AUTO: 318 K/UL (ref 150–400)
PLATELET # BLD AUTO: 373 K/UL (ref 150–400)
PMV BLD AUTO: 9.3 FL (ref 8.9–12.9)
PMV BLD AUTO: 9.7 FL (ref 8.9–12.9)
POTASSIUM SERPL-SCNC: 3.7 MMOL/L (ref 3.5–5.1)
PROCALCITONIN SERPL-MCNC: <0.05 NG/ML
PROT UR STRIP-MCNC: NEGATIVE MG/DL
RBC # BLD AUTO: 4.37 M/UL (ref 3.8–5.2)
RBC # BLD AUTO: 4.78 M/UL (ref 3.8–5.2)
RBC #/AREA URNS HPF: ABNORMAL /HPF (ref 0–5)
RBC MORPH BLD: ABNORMAL
SERVICE CMNT-IMP: ABNORMAL
SODIUM SERPL-SCNC: 144 MMOL/L (ref 136–145)
SP GR UR REFRACTOMETRY: 1 (ref 1–1.03)
TROPONIN I SERPL HS-MCNC: 26 NG/L (ref 0–51)
TROPONIN I SERPL HS-MCNC: 66 NG/L (ref 0–51)
URINE CULTURE IF INDICATED: ABNORMAL
UROBILINOGEN UR QL STRIP.AUTO: 0.2 EU/DL (ref 0.2–1)
WBC # BLD AUTO: 10.1 K/UL (ref 3.6–11)
WBC # BLD AUTO: 11.5 K/UL (ref 3.6–11)
WBC MORPH BLD: ABNORMAL
WBC URNS QL MICRO: ABNORMAL /HPF (ref 0–4)

## 2025-05-03 PROCEDURE — G0378 HOSPITAL OBSERVATION PER HR: HCPCS

## 2025-05-03 PROCEDURE — 85025 COMPLETE CBC W/AUTO DIFF WBC: CPT

## 2025-05-03 PROCEDURE — 96366 THER/PROPH/DIAG IV INF ADDON: CPT

## 2025-05-03 PROCEDURE — 83605 ASSAY OF LACTIC ACID: CPT

## 2025-05-03 PROCEDURE — 82962 GLUCOSE BLOOD TEST: CPT

## 2025-05-03 PROCEDURE — 93306 TTE W/DOPPLER COMPLETE: CPT | Performed by: SPECIALIST

## 2025-05-03 PROCEDURE — 6370000000 HC RX 637 (ALT 250 FOR IP): Performed by: HOSPITALIST

## 2025-05-03 PROCEDURE — 2580000003 HC RX 258: Performed by: EMERGENCY MEDICINE

## 2025-05-03 PROCEDURE — 84145 PROCALCITONIN (PCT): CPT

## 2025-05-03 PROCEDURE — 2500000003 HC RX 250 WO HCPCS: Performed by: EMERGENCY MEDICINE

## 2025-05-03 PROCEDURE — 93306 TTE W/DOPPLER COMPLETE: CPT

## 2025-05-03 PROCEDURE — 36415 COLL VENOUS BLD VENIPUNCTURE: CPT

## 2025-05-03 PROCEDURE — 80048 BASIC METABOLIC PNL TOTAL CA: CPT

## 2025-05-03 PROCEDURE — 2580000003 HC RX 258: Performed by: HOSPITALIST

## 2025-05-03 PROCEDURE — 6360000002 HC RX W HCPCS: Performed by: EMERGENCY MEDICINE

## 2025-05-03 PROCEDURE — 96375 TX/PRO/DX INJ NEW DRUG ADDON: CPT

## 2025-05-03 PROCEDURE — 93010 ELECTROCARDIOGRAM REPORT: CPT | Performed by: INTERNAL MEDICINE

## 2025-05-03 PROCEDURE — 84100 ASSAY OF PHOSPHORUS: CPT

## 2025-05-03 PROCEDURE — 87040 BLOOD CULTURE FOR BACTERIA: CPT

## 2025-05-03 PROCEDURE — 81001 URINALYSIS AUTO W/SCOPE: CPT

## 2025-05-03 PROCEDURE — 94640 AIRWAY INHALATION TREATMENT: CPT

## 2025-05-03 PROCEDURE — 6360000002 HC RX W HCPCS: Performed by: HOSPITALIST

## 2025-05-03 PROCEDURE — 2500000003 HC RX 250 WO HCPCS: Performed by: HOSPITALIST

## 2025-05-03 PROCEDURE — 83036 HEMOGLOBIN GLYCOSYLATED A1C: CPT

## 2025-05-03 PROCEDURE — 96368 THER/DIAG CONCURRENT INF: CPT

## 2025-05-03 PROCEDURE — 93005 ELECTROCARDIOGRAM TRACING: CPT | Performed by: HOSPITALIST

## 2025-05-03 PROCEDURE — 94761 N-INVAS EAR/PLS OXIMETRY MLT: CPT

## 2025-05-03 PROCEDURE — 84484 ASSAY OF TROPONIN QUANT: CPT

## 2025-05-03 RX ORDER — METOPROLOL TARTRATE 1 MG/ML
5 INJECTION, SOLUTION INTRAVENOUS EVERY 6 HOURS PRN
Status: DISCONTINUED | OUTPATIENT
Start: 2025-05-03 | End: 2025-05-09 | Stop reason: HOSPADM

## 2025-05-03 RX ORDER — METOPROLOL TARTRATE 1 MG/ML
5 INJECTION, SOLUTION INTRAVENOUS ONCE
Status: COMPLETED | OUTPATIENT
Start: 2025-05-03 | End: 2025-05-03

## 2025-05-03 RX ORDER — ARTIFICIAL TEARS 1; 2; 3 MG/ML; MG/ML; MG/ML
1 SOLUTION/ DROPS OPHTHALMIC EVERY 4 HOURS PRN
Status: DISCONTINUED | OUTPATIENT
Start: 2025-05-03 | End: 2025-05-09 | Stop reason: HOSPADM

## 2025-05-03 RX ORDER — MONTELUKAST SODIUM 10 MG/1
10 TABLET ORAL NIGHTLY
Status: DISCONTINUED | OUTPATIENT
Start: 2025-05-03 | End: 2025-05-09 | Stop reason: HOSPADM

## 2025-05-03 RX ORDER — FENOFIBRATE 160 MG/1
160 TABLET ORAL DAILY
Status: DISCONTINUED | OUTPATIENT
Start: 2025-05-03 | End: 2025-05-09 | Stop reason: HOSPADM

## 2025-05-03 RX ORDER — POTASSIUM CHLORIDE 7.45 MG/ML
10 INJECTION INTRAVENOUS PRN
Status: DISCONTINUED | OUTPATIENT
Start: 2025-05-03 | End: 2025-05-09 | Stop reason: HOSPADM

## 2025-05-03 RX ORDER — ACETAMINOPHEN 650 MG/1
650 SUPPOSITORY RECTAL EVERY 6 HOURS PRN
Status: DISCONTINUED | OUTPATIENT
Start: 2025-05-03 | End: 2025-05-09 | Stop reason: HOSPADM

## 2025-05-03 RX ORDER — ONDANSETRON 2 MG/ML
4 INJECTION INTRAMUSCULAR; INTRAVENOUS EVERY 6 HOURS PRN
Status: DISCONTINUED | OUTPATIENT
Start: 2025-05-03 | End: 2025-05-09 | Stop reason: HOSPADM

## 2025-05-03 RX ORDER — BUDESONIDE 0.5 MG/2ML
0.5 INHALANT ORAL 2 TIMES DAILY
Status: DISCONTINUED | OUTPATIENT
Start: 2025-05-03 | End: 2025-05-03

## 2025-05-03 RX ORDER — SODIUM CHLORIDE 0.9 % (FLUSH) 0.9 %
5-40 SYRINGE (ML) INJECTION PRN
Status: DISCONTINUED | OUTPATIENT
Start: 2025-05-03 | End: 2025-05-09 | Stop reason: HOSPADM

## 2025-05-03 RX ORDER — FLECAINIDE ACETATE 50 MG/1
100 TABLET ORAL EVERY 12 HOURS SCHEDULED
Status: DISCONTINUED | OUTPATIENT
Start: 2025-05-03 | End: 2025-05-04

## 2025-05-03 RX ORDER — SODIUM CHLORIDE 9 MG/ML
INJECTION, SOLUTION INTRAVENOUS PRN
Status: DISCONTINUED | OUTPATIENT
Start: 2025-05-03 | End: 2025-05-09 | Stop reason: HOSPADM

## 2025-05-03 RX ORDER — ONDANSETRON 4 MG/1
4 TABLET, ORALLY DISINTEGRATING ORAL EVERY 8 HOURS PRN
Status: DISCONTINUED | OUTPATIENT
Start: 2025-05-03 | End: 2025-05-09 | Stop reason: HOSPADM

## 2025-05-03 RX ORDER — BUDESONIDE 0.5 MG/2ML
0.5 INHALANT ORAL
Status: DISCONTINUED | OUTPATIENT
Start: 2025-05-03 | End: 2025-05-09 | Stop reason: HOSPADM

## 2025-05-03 RX ORDER — POTASSIUM CHLORIDE 7.45 MG/ML
10 INJECTION INTRAVENOUS
Status: DISPENSED | OUTPATIENT
Start: 2025-05-03 | End: 2025-05-03

## 2025-05-03 RX ORDER — POTASSIUM CHLORIDE 750 MG/1
40 TABLET, EXTENDED RELEASE ORAL ONCE
Status: COMPLETED | OUTPATIENT
Start: 2025-05-03 | End: 2025-05-03

## 2025-05-03 RX ORDER — MAGNESIUM SULFATE 1 G/100ML
1000 INJECTION INTRAVENOUS ONCE
Status: COMPLETED | OUTPATIENT
Start: 2025-05-03 | End: 2025-05-03

## 2025-05-03 RX ORDER — POTASSIUM CHLORIDE 750 MG/1
40 TABLET, EXTENDED RELEASE ORAL PRN
Status: DISCONTINUED | OUTPATIENT
Start: 2025-05-03 | End: 2025-05-09 | Stop reason: HOSPADM

## 2025-05-03 RX ORDER — SODIUM CHLORIDE 0.9 % (FLUSH) 0.9 %
5-40 SYRINGE (ML) INJECTION EVERY 12 HOURS SCHEDULED
Status: DISCONTINUED | OUTPATIENT
Start: 2025-05-03 | End: 2025-05-09 | Stop reason: HOSPADM

## 2025-05-03 RX ORDER — NITROGLYCERIN 0.4 MG/1
0.4 TABLET SUBLINGUAL EVERY 5 MIN PRN
Status: DISCONTINUED | OUTPATIENT
Start: 2025-05-03 | End: 2025-05-09 | Stop reason: HOSPADM

## 2025-05-03 RX ORDER — POLYETHYLENE GLYCOL 3350 17 G/17G
17 POWDER, FOR SOLUTION ORAL DAILY PRN
Status: DISCONTINUED | OUTPATIENT
Start: 2025-05-03 | End: 2025-05-04

## 2025-05-03 RX ORDER — MAGNESIUM SULFATE IN WATER 40 MG/ML
2000 INJECTION, SOLUTION INTRAVENOUS PRN
Status: DISCONTINUED | OUTPATIENT
Start: 2025-05-03 | End: 2025-05-09 | Stop reason: HOSPADM

## 2025-05-03 RX ORDER — SODIUM CHLORIDE 9 MG/ML
INJECTION, SOLUTION INTRAVENOUS CONTINUOUS
Status: DISPENSED | OUTPATIENT
Start: 2025-05-03 | End: 2025-05-04

## 2025-05-03 RX ORDER — LEVOTHYROXINE SODIUM 25 UG/1
25 TABLET ORAL DAILY
Status: DISCONTINUED | OUTPATIENT
Start: 2025-05-03 | End: 2025-05-09 | Stop reason: HOSPADM

## 2025-05-03 RX ORDER — ACETAMINOPHEN 325 MG/1
650 TABLET ORAL EVERY 6 HOURS PRN
Status: DISCONTINUED | OUTPATIENT
Start: 2025-05-03 | End: 2025-05-09 | Stop reason: HOSPADM

## 2025-05-03 RX ADMIN — SODIUM CHLORIDE, PRESERVATIVE FREE 10 ML: 5 INJECTION INTRAVENOUS at 20:31

## 2025-05-03 RX ADMIN — SODIUM CHLORIDE 2433 ML: 0.9 INJECTION, SOLUTION INTRAVENOUS at 00:07

## 2025-05-03 RX ADMIN — SODIUM CHLORIDE: 0.9 INJECTION, SOLUTION INTRAVENOUS at 03:56

## 2025-05-03 RX ADMIN — AZITHROMYCIN MONOHYDRATE 500 MG: 500 INJECTION, POWDER, LYOPHILIZED, FOR SOLUTION INTRAVENOUS at 01:56

## 2025-05-03 RX ADMIN — BUDESONIDE 500 MCG: 0.5 INHALANT RESPIRATORY (INHALATION) at 20:46

## 2025-05-03 RX ADMIN — SODIUM CHLORIDE, PRESERVATIVE FREE 10 ML: 5 INJECTION INTRAVENOUS at 08:45

## 2025-05-03 RX ADMIN — METOPROLOL TARTRATE 5 MG: 5 INJECTION INTRAVENOUS at 01:40

## 2025-05-03 RX ADMIN — APIXABAN 5 MG: 5 TABLET, FILM COATED ORAL at 20:30

## 2025-05-03 RX ADMIN — LEVOTHYROXINE SODIUM 25 MCG: 0.05 TABLET ORAL at 08:43

## 2025-05-03 RX ADMIN — POTASSIUM CHLORIDE 10 MEQ: 7.46 INJECTION, SOLUTION INTRAVENOUS at 05:14

## 2025-05-03 RX ADMIN — FLECAINIDE ACETATE 100 MG: 50 TABLET ORAL at 08:43

## 2025-05-03 RX ADMIN — POTASSIUM PHOSPHATE, MONOBASIC POTASSIUM PHOSPHATE, DIBASIC 15 MMOL: 224; 236 INJECTION, SOLUTION, CONCENTRATE INTRAVENOUS at 08:39

## 2025-05-03 RX ADMIN — APIXABAN 5 MG: 5 TABLET, FILM COATED ORAL at 08:42

## 2025-05-03 RX ADMIN — SODIUM CHLORIDE: 0.9 INJECTION, SOLUTION INTRAVENOUS at 17:38

## 2025-05-03 RX ADMIN — POTASSIUM CHLORIDE 10 MEQ: 7.46 INJECTION, SOLUTION INTRAVENOUS at 03:50

## 2025-05-03 RX ADMIN — POTASSIUM CHLORIDE 40 MEQ: 750 TABLET, FILM COATED, EXTENDED RELEASE ORAL at 01:46

## 2025-05-03 RX ADMIN — WATER 1000 MG: 1 INJECTION INTRAMUSCULAR; INTRAVENOUS; SUBCUTANEOUS at 00:13

## 2025-05-03 RX ADMIN — FLECAINIDE ACETATE 100 MG: 50 TABLET ORAL at 20:30

## 2025-05-03 RX ADMIN — MAGNESIUM SULFATE HEPTAHYDRATE 1000 MG: 1 INJECTION, SOLUTION INTRAVENOUS at 02:54

## 2025-05-03 RX ADMIN — POTASSIUM CHLORIDE 10 MEQ: 7.46 INJECTION, SOLUTION INTRAVENOUS at 06:34

## 2025-05-03 RX ADMIN — BUDESONIDE 500 MCG: 0.5 INHALANT RESPIRATORY (INHALATION) at 08:03

## 2025-05-03 RX ADMIN — ACETAMINOPHEN 650 MG: 325 TABLET ORAL at 18:34

## 2025-05-03 RX ADMIN — ONDANSETRON 4 MG: 4 TABLET, ORALLY DISINTEGRATING ORAL at 02:42

## 2025-05-03 RX ADMIN — POTASSIUM CHLORIDE 10 MEQ: 7.46 INJECTION, SOLUTION INTRAVENOUS at 02:37

## 2025-05-03 RX ADMIN — SODIUM CHLORIDE 12.5 MG/HR: 900 INJECTION, SOLUTION INTRAVENOUS at 04:44

## 2025-05-03 RX ADMIN — FENOFIBRATE 160 MG: 160 TABLET ORAL at 08:42

## 2025-05-03 RX ADMIN — MONTELUKAST 10 MG: 10 TABLET, FILM COATED ORAL at 20:30

## 2025-05-03 ASSESSMENT — PAIN SCALES - GENERAL: PAINLEVEL_OUTOF10: 5

## 2025-05-03 ASSESSMENT — PAIN DESCRIPTION - LOCATION: LOCATION: HEAD;SHOULDER

## 2025-05-03 ASSESSMENT — PAIN DESCRIPTION - DESCRIPTORS: DESCRIPTORS: ACHING

## 2025-05-03 NOTE — ED PROVIDER NOTES
[facility] EMERGENCY DEPARTMENT  EMERGENCY DEPARTMENT ENCOUNTER      Pt Name: Gail Amaya  MRN: [id number]  Birthdate 1950  Date of evaluation: 5/2/2025  Provider: Pratik Byrd MD    CHIEF COMPLAINT       Chief Complaint   Patient presents with    Shortness of Breath    Chest Pain         HISTORY OF PRESENT ILLNESS   (Location/Symptom, Timing/Onset, Context/Setting, Quality, Duration, Modifying Factors, Severity)  Note limiting factors.   74-year-old female presents by EMS for A-fib with RVR.  Tachycardia is been in the 160s and 170s and she had a syncopal episode while transferring to the EMS cart.  No fall or injury.  She has a history of atrial for with RVR and is currently on Eliquis.  She was in [facility] for the past 2 days and was discharged earlier today at about 5 PM from [facility] and arrives in this ER at 10:50 PM.    The history is provided by the patient.   Palpitations  Palpitations quality:  Fast  Onset quality:  Sudden  Duration:  1 hour  Timing:  Constant  Progression:  Worsening  Chronicity:  Recurrent  Context: not anxiety, not appetite suppressants, not blood loss, not bronchodilators, not caffeine, not dehydration and not exercise    Context comment:  A-fib with RVR  Relieved by: Cardizem bolus and drip in the ER at [facility].  Worsened by:  Nothing  Ineffective treatments:  None tried  Associated symptoms: no back pain, no chest pain, no chest pressure, no cough, no diaphoresis, no dizziness, no leg pain, no lower extremity edema, no malaise/fatigue, no nausea, no near-syncope, no orthopnea, no PND, no shortness of breath and no vomiting          Review of External Medical Records:     Nursing Notes were reviewed.    REVIEW OF SYSTEMS    (2-9 systems for level 4, 10 or more for level 5)     Review of Systems   Constitutional:  Negative for activity change, appetite change, chills, diaphoresis and malaise/fatigue.   HENT:  Negative for congestion, ear pain,  PEPTIDE   PROCALCITONIN   URINALYSIS WITH REFLEX TO CULTURE   POCT LACTIC ACID   POCT LACTIC ACID   POCT GLUCOSE       All other labs were within normal range or not returned as of this dictation.    EMERGENCY DEPARTMENT COURSE and DIFFERENTIAL DIAGNOSIS/MDM:   Vitals:    Vitals:    05/02/25 2300 05/02/25 2305 05/02/25 2332 05/02/25 2342   BP:   (!) 156/88    Pulse: (!) 129  (!) 125 (!) 146   Resp: (!) 32  21 26   Temp:  97.4 °F (36.3 °C)     TempSrc:  Oral     SpO2: 97%  96% 96%   Weight:               Medical Decision Making  74-year-old female with tachycardia that appears to be A-fib with RVR.  We will do Cardizem bolus and drip as she had at [facility] yesterday.  If ineffective we will try amiodarone.    Amount and/or Complexity of Data Reviewed  Labs: ordered.  Radiology: ordered.  ECG/medicine tests: ordered.    Risk  Prescription drug management.  Decision regarding hospitalization.            REASSESSMENT     ED Course as of 05/02/25 2356   Fri May 02, 2025   2352 74-year-old female discharged from [facility] 6 hours ago for atrial for with RVR.  She is back in atrial for with RVR.  Cardizem bolus and drip have been given and she is down from 170 bpm to 130 bpm.  She is on Eliquis already.  She is also noted to have a lactic acid of 4.3 and white count of 11.7.  Code sepsis has been called and she will be given Zosyn for sepsis of unknown origin.  Chest x-ray is negative.  Recurrent A-fib with RVR on Cardizem drip, sepsis of unknown origin with lactic acid greater than 4. [VM]      ED Course User Index  [VM] Pratik Byrd MD           CONSULTS:  None    PROCEDURES:  Unless otherwise noted below, none     Procedures    Perfect Serve Consult for Admission  11:56 PM    ED Room Number: ER04/04  Patient Name and age:  Gail Amaya 74 y.o.  female  Working Diagnosis:   1. Atrial fibrillation with RVR (HCC)    2. Sepsis, due to unspecified organism, unspecified whether acute organ dysfunction present (HCC)

## 2025-05-03 NOTE — ED TRIAGE NOTES
Pt arrives via EMS, states she was d/c from [facility] today .. EMS called for CP and SOB. States she has hx of afib RVR. HR was 170s upon EMS arrival. While EMS was transferring pt to stretcher pt had syncopal episode lasting approx 2 mins.

## 2025-05-03 NOTE — ED NOTES
TRANSFER - OUT REPORT:    Verbal report given to SHRUTHI Eubanks on Gail Amaya  being transferred to [facility] for routine progression of patient care       Report consisted of patient's Situation, Background, Assessment and   Recommendations(SBAR).     Information from the following report(s) Nurse Handoff Report was reviewed with the receiving nurse.    [place] Fall Assessment:    Presents to emergency department  because of falls (Syncope, seizure, or loss of consciousness): Yes  Age > 70: Yes  Altered Mental Status, Intoxication with alcohol or substance confusion (Disorientation, impaired judgment, poor safety awaremess, or inability to follow instructions): Yes  Impaired Mobility: Ambulates or transfers with assistive devices or assistance; Unable to ambulate or transer.: Yes  Nursing Judgement: Yes          Lines:   Peripheral IV Left Antecubital (Active)   Site Assessment Clean, dry & intact 05/02/25 2254   Line Status Blood return noted;Brisk blood return;No blood return 05/02/25 2254   Line Care Connections checked and tightened;Cap changed 05/02/25 2254   Phlebitis Assessment No symptoms 05/02/25 2254   Infiltration Assessment 0 05/02/25 2254   Alcohol Cap Used No 05/02/25 2254   Dressing Status Clean, dry & intact 05/02/25 2254   Dressing Type Transparent 05/02/25 2254   Dressing Intervention New 05/02/25 2254       Peripheral IV 05/02/25 Right Antecubital (Active)   Site Assessment Clean, dry & intact 05/02/25 2305   Line Status Blood return noted;Brisk blood return;Specimen collected;Flushed 05/02/25 2305   Line Care Connections checked and tightened;Cap changed 05/02/25 2305   Phlebitis Assessment No symptoms 05/02/25 2305   Infiltration Assessment 0 05/02/25 2305   Alcohol Cap Used No 05/02/25 2305   Dressing Status Clean, dry & intact;New dressing applied 05/02/25 2305   Dressing Type Transparent 05/02/25 2305   Dressing Intervention New 05/02/25 2305        Opportunity for questions and clarification was

## 2025-05-03 NOTE — H&P
Hospitalist Admission Note    NAME: Gail Amaya   :  1950   MRN:  [id number]     Date/Time:  5/3/2025 1:23 AM    Patient PCP: Rodolfo Rodriguez MD    Please note that this dictation was completed with [employer], the computer voice recognition software.  Quite often unanticipated grammatical, syntax, homophones, and other interpretive errors are inadvertently transcribed by the computer software.  Please disregard these errors.  Please excuse any errors that have escaped final proofreading  ________________________________________________________________________    Given the patient's current clinical presentation, I have a high level of concern for decompensation if discharged from the emergency department.  Complex decision making was performed, which includes reviewing the patient's available past medical records, laboratory results, and x-ray films.       My assessment of this patient's clinical condition and my plan of care is as follows.    Assessment / Plan:    Afib with RVR, POA  Hx afib since   --just dc from [facility] yesterday for afib RVR.  Planned for ablation  with Dr. Natalia MAHARAJ  --patrick on diltiazem drip 15mg/hr HR 130s  --cont flecainide recently increased from 50mg to 100mg bid.    --add IV metoprolol 5mg now and q6h prn.  MTP stopped at [facility] and changed to diltiazem ER 120mg  --consider IV digoxin but would wait until K+ corrects  --cardiology consult; echo.  TSH normal at [facility] 2.4  --cont eliquis    SIRS, POA due to afib RVR r/o severe sepsis  HR 130s, WBC 11.5, lactic 4.3-->3.8  LLL atelectasis vs PNA  --cont rocephin, add azithromycin  --procalcitonin pending.  Lungs clear  --serial lactic; has not finished fluid bolus.  Suspect lactic acidosis due to afib with RVR.    Asthma  --on xolair injection  --cont budesonide, singulair    HTN  Hypokalemia 3.2  --cont dilt drip, IV metoprolol x 1  --hold triamterene hctz due to hypoK  --kdur 40meq and 40meq IV Kcl.  Check mag

## 2025-05-03 NOTE — PROGRESS NOTES
Hospitalist Progress Note      NAME:  Gail Amaya   :  1950  MRM:  [id number]    Date/Time: 5/3/2025  7:05 AM           Assessment / Plan:     Gail Amaya is a 74 y.o. female with atrial fibrillation, asthma, HTN, RA, hypothyroidism, peripheral neuropathy, and hyperlipidemia.  She was admitted on 2025 for evaluation/management of atrial fibrillation with RVR.    #Atrial fibrillation with RVR: reason for admission.  Resolved with diltiazem drip.  Follows with Dr. Fisher (at [facility]) as an outpatient.  Recently admitted at [facility] for the same and is scheduled for ablation with Dr. Fisher on .  Presented here after feeling very poorly and having a syncopal event in the setting of RVR.  -Cardiology consulted  -Continue diltiazem drip.  Transition to PO soon hopefully?  -Metoprolol IV PRN for RVR  -Anticipate monitoring the patient for 24-48 hours after she is transitioned to PO meds  -Consider transfer to [facility] for ablation with Dr. Fisher  -TTE  -Continue telemetry  -Continue home apixaban and flecainide    #Elevated troponin: present on admission but trending down now.  This is not NSTEMI-- rather, this was 2/2 afib with RVR.    #Abnormal chest imaging. Present on admission-- CXR showed atelectasis.  Procalcitonin negative-- low suspicion for PNA.  -Hold off on further antibiotics    #SIRS: present on admission with elevated HR and elevated RR-- now not consistently meeting criteria.  Low suspicion for infection at this point.  -Follow blood cultures collected on admission    #Hypokalemia: present on admission, improved.    #JEANETTE: present on admission, resolved.  Likely 2/2 impaired kidney function during afib with RVR.  -Afib tx as above    #Hyperglycemia: no A1C on file.  -A1C  -No SSI for now    #Hypocalcemia    #HLD  -Continue home fenofibrate    #Hypothyroidism  -Continue home levothyroxine    #Asthma: reportedly on Xolair injections as

## 2025-05-03 NOTE — PLAN OF CARE
Problem: Chronic Conditions and Co-morbidities  Goal: Patient's chronic conditions and co-morbidity symptoms are monitored and maintained or improved  Outcome: Progressing  Flowsheets  Taken 5/3/2025 0800 by Cassie Gonzales RN  Care Plan - Patient's Chronic Conditions and Co-Morbidity Symptoms are Monitored and Maintained or Improved: Monitor and assess patient's chronic conditions and comorbid symptoms for stability, deterioration, or improvement  Taken 5/3/2025 0430 by Raimundo Sy RN  Care Plan - Patient's Chronic Conditions and Co-Morbidity Symptoms are Monitored and Maintained or Improved:   Monitor and assess patient's chronic conditions and comorbid symptoms for stability, deterioration, or improvement   Collaborate with multidisciplinary team to address chronic and comorbid conditions and prevent exacerbation or deterioration   Update acute care plan with appropriate goals if chronic or comorbid symptoms are exacerbated and prevent overall improvement and discharge     Problem: Discharge Planning  Goal: Discharge to home or other facility with appropriate resources  Outcome: Progressing  Flowsheets  Taken 5/3/2025 0800 by Cassie Gonzales RN  Discharge to home or other facility with appropriate resources: Identify barriers to discharge with patient and caregiver  Taken 5/3/2025 0430 by Raimundo Sy RN  Discharge to home or other facility with appropriate resources:   Identify barriers to discharge with patient and caregiver   Arrange for needed discharge resources and transportation as appropriate   Identify discharge learning needs (meds, wound care, etc)   Arrange for interpreters to assist at discharge as needed   Refer to discharge planning if patient needs post-hospital services based on physician order or complex needs related to functional status, cognitive ability or social support system     Problem: Respiratory - Adult  Goal: Achieves optimal ventilation and oxygenation  5/3/2025 0958 by Christian  Cassie, RN  Outcome: Progressing  5/3/2025 0829 by Blanche Hines, RT  Outcome: Progressing  Flowsheets (Taken 5/3/2025 0800 by Cassie Gonzales, RN)  Achieves optimal ventilation and oxygenation: Assess for changes in respiratory status

## 2025-05-04 LAB
ALBUMIN SERPL-MCNC: 3.5 G/DL (ref 3.5–5)
ALBUMIN/GLOB SERPL: 0.9 (ref 1.1–2.2)
ALP SERPL-CCNC: 52 U/L (ref 45–117)
ALT SERPL-CCNC: 65 U/L (ref 12–78)
ANION GAP SERPL CALC-SCNC: 6 MMOL/L (ref 2–12)
ANION GAP SERPL CALC-SCNC: 8 MMOL/L (ref 2–12)
AST SERPL-CCNC: 41 U/L (ref 15–37)
BILIRUB SERPL-MCNC: 0.3 MG/DL (ref 0.2–1)
BUN SERPL-MCNC: 10 MG/DL (ref 6–20)
BUN SERPL-MCNC: 12 MG/DL (ref 6–20)
BUN/CREAT SERPL: 12 (ref 12–20)
BUN/CREAT SERPL: 16 (ref 12–20)
CALCIUM SERPL-MCNC: 7.7 MG/DL (ref 8.5–10.1)
CALCIUM SERPL-MCNC: 9.7 MG/DL (ref 8.5–10.1)
CHLORIDE SERPL-SCNC: 107 MMOL/L (ref 97–108)
CHLORIDE SERPL-SCNC: 112 MMOL/L (ref 97–108)
CO2 SERPL-SCNC: 23 MMOL/L (ref 21–32)
CO2 SERPL-SCNC: 23 MMOL/L (ref 21–32)
CREAT SERPL-MCNC: 0.64 MG/DL (ref 0.55–1.02)
CREAT SERPL-MCNC: 0.98 MG/DL (ref 0.55–1.02)
ERYTHROCYTE [DISTWIDTH] IN BLOOD BY AUTOMATED COUNT: 13.2 % (ref 11.5–14.5)
GLOBULIN SER CALC-MCNC: 4 G/DL (ref 2–4)
GLUCOSE SERPL-MCNC: 150 MG/DL (ref 65–100)
GLUCOSE SERPL-MCNC: 96 MG/DL (ref 65–100)
HCT VFR BLD AUTO: 42.1 % (ref 35–47)
HGB BLD-MCNC: 14.3 G/DL (ref 11.5–16)
MAGNESIUM SERPL-MCNC: 1.8 MG/DL (ref 1.6–2.4)
MCH RBC QN AUTO: 31.7 PG (ref 26–34)
MCHC RBC AUTO-ENTMCNC: 34 G/DL (ref 30–36.5)
MCV RBC AUTO: 93.3 FL (ref 80–99)
NRBC # BLD: 0 K/UL (ref 0–0.01)
NRBC BLD-RTO: 0 PER 100 WBC
PLATELET # BLD AUTO: 351 K/UL (ref 150–400)
PMV BLD AUTO: 9.6 FL (ref 8.9–12.9)
POTASSIUM SERPL-SCNC: 3.6 MMOL/L (ref 3.5–5.1)
POTASSIUM SERPL-SCNC: 3.7 MMOL/L (ref 3.5–5.1)
PROT SERPL-MCNC: 7.5 G/DL (ref 6.4–8.2)
RBC # BLD AUTO: 4.51 M/UL (ref 3.8–5.2)
SODIUM SERPL-SCNC: 138 MMOL/L (ref 136–145)
SODIUM SERPL-SCNC: 141 MMOL/L (ref 136–145)
TROPONIN I SERPL HS-MCNC: 7 NG/L (ref 0–51)
WBC # BLD AUTO: 11.1 K/UL (ref 3.6–11)

## 2025-05-04 PROCEDURE — 6360000002 HC RX W HCPCS: Performed by: HOSPITALIST

## 2025-05-04 PROCEDURE — 99223 1ST HOSP IP/OBS HIGH 75: CPT | Performed by: INTERNAL MEDICINE

## 2025-05-04 PROCEDURE — 93005 ELECTROCARDIOGRAM TRACING: CPT | Performed by: EMERGENCY MEDICINE

## 2025-05-04 PROCEDURE — 2060000000 HC ICU INTERMEDIATE R&B

## 2025-05-04 PROCEDURE — 6370000000 HC RX 637 (ALT 250 FOR IP): Performed by: HOSPITALIST

## 2025-05-04 PROCEDURE — 36415 COLL VENOUS BLD VENIPUNCTURE: CPT

## 2025-05-04 PROCEDURE — 96366 THER/PROPH/DIAG IV INF ADDON: CPT

## 2025-05-04 PROCEDURE — 6370000000 HC RX 637 (ALT 250 FOR IP)

## 2025-05-04 PROCEDURE — 94761 N-INVAS EAR/PLS OXIMETRY MLT: CPT

## 2025-05-04 PROCEDURE — 6360000002 HC RX W HCPCS: Performed by: INTERNAL MEDICINE

## 2025-05-04 PROCEDURE — G0378 HOSPITAL OBSERVATION PER HR: HCPCS

## 2025-05-04 PROCEDURE — 84484 ASSAY OF TROPONIN QUANT: CPT

## 2025-05-04 PROCEDURE — 80053 COMPREHEN METABOLIC PANEL: CPT

## 2025-05-04 PROCEDURE — 96375 TX/PRO/DX INJ NEW DRUG ADDON: CPT

## 2025-05-04 PROCEDURE — 85027 COMPLETE CBC AUTOMATED: CPT

## 2025-05-04 PROCEDURE — 2500000003 HC RX 250 WO HCPCS: Performed by: INTERNAL MEDICINE

## 2025-05-04 PROCEDURE — 83735 ASSAY OF MAGNESIUM: CPT

## 2025-05-04 PROCEDURE — 2500000003 HC RX 250 WO HCPCS: Performed by: HOSPITALIST

## 2025-05-04 PROCEDURE — 96367 TX/PROPH/DG ADDL SEQ IV INF: CPT

## 2025-05-04 PROCEDURE — 94640 AIRWAY INHALATION TREATMENT: CPT

## 2025-05-04 PROCEDURE — 2580000003 HC RX 258: Performed by: INTERNAL MEDICINE

## 2025-05-04 PROCEDURE — 6370000000 HC RX 637 (ALT 250 FOR IP): Performed by: INTERNAL MEDICINE

## 2025-05-04 PROCEDURE — 1100000000 HC RM PRIVATE

## 2025-05-04 RX ORDER — ARTIFICIAL TEARS 1; 2; 3 MG/ML; MG/ML; MG/ML
1 SOLUTION/ DROPS OPHTHALMIC EVERY 12 HOURS
Status: DISCONTINUED | OUTPATIENT
Start: 2025-05-04 | End: 2025-05-09 | Stop reason: HOSPADM

## 2025-05-04 RX ORDER — PROCHLORPERAZINE EDISYLATE 5 MG/ML
10 INJECTION INTRAMUSCULAR; INTRAVENOUS EVERY 6 HOURS PRN
Status: DISCONTINUED | OUTPATIENT
Start: 2025-05-04 | End: 2025-05-09 | Stop reason: HOSPADM

## 2025-05-04 RX ORDER — DILTIAZEM HYDROCHLORIDE 120 MG/1
120 CAPSULE, COATED, EXTENDED RELEASE ORAL DAILY
Status: DISCONTINUED | OUTPATIENT
Start: 2025-05-04 | End: 2025-05-06

## 2025-05-04 RX ORDER — POTASSIUM CHLORIDE 750 MG/1
40 TABLET, EXTENDED RELEASE ORAL ONCE
Status: COMPLETED | OUTPATIENT
Start: 2025-05-04 | End: 2025-05-04

## 2025-05-04 RX ORDER — SUMATRIPTAN SUCCINATE 25 MG/1
50 TABLET ORAL ONCE
Status: COMPLETED | OUTPATIENT
Start: 2025-05-04 | End: 2025-05-04

## 2025-05-04 RX ORDER — ESTRADIOL 0.05 MG/D
1 PATCH TRANSDERMAL WEEKLY
Status: DISCONTINUED | OUTPATIENT
Start: 2025-05-04 | End: 2025-05-04

## 2025-05-04 RX ORDER — CALCIUM GLUCONATE 20 MG/ML
1000 INJECTION, SOLUTION INTRAVENOUS ONCE
Status: COMPLETED | OUTPATIENT
Start: 2025-05-04 | End: 2025-05-04

## 2025-05-04 RX ORDER — AMIODARONE HYDROCHLORIDE 200 MG/1
400 TABLET ORAL 2 TIMES DAILY
Status: DISCONTINUED | OUTPATIENT
Start: 2025-05-05 | End: 2025-05-09 | Stop reason: HOSPADM

## 2025-05-04 RX ORDER — BUTALBITAL, ACETAMINOPHEN AND CAFFEINE 50; 325; 40 MG/1; MG/1; MG/1
1 TABLET ORAL EVERY 4 HOURS PRN
Status: DISCONTINUED | OUTPATIENT
Start: 2025-05-04 | End: 2025-05-09 | Stop reason: HOSPADM

## 2025-05-04 RX ORDER — KETOROLAC TROMETHAMINE 15 MG/ML
15 INJECTION, SOLUTION INTRAMUSCULAR; INTRAVENOUS EVERY 6 HOURS PRN
Status: ACTIVE | OUTPATIENT
Start: 2025-05-04 | End: 2025-05-06

## 2025-05-04 RX ORDER — POTASSIUM CHLORIDE 750 MG/1
20 TABLET, EXTENDED RELEASE ORAL DAILY
Status: DISCONTINUED | OUTPATIENT
Start: 2025-05-05 | End: 2025-05-09 | Stop reason: HOSPADM

## 2025-05-04 RX ORDER — POLYETHYLENE GLYCOL 3350 17 G/17G
17 POWDER, FOR SOLUTION ORAL DAILY
Status: DISCONTINUED | OUTPATIENT
Start: 2025-05-04 | End: 2025-05-06

## 2025-05-04 RX ORDER — DIPHENHYDRAMINE HYDROCHLORIDE 50 MG/ML
12.5 INJECTION, SOLUTION INTRAMUSCULAR; INTRAVENOUS EVERY 6 HOURS PRN
Status: DISCONTINUED | OUTPATIENT
Start: 2025-05-04 | End: 2025-05-09 | Stop reason: HOSPADM

## 2025-05-04 RX ORDER — ESTRADIOL 0.1 MG/D
1 PATCH TRANSDERMAL WEEKLY
Status: DISCONTINUED | OUTPATIENT
Start: 2025-05-04 | End: 2025-05-09 | Stop reason: HOSPADM

## 2025-05-04 RX ADMIN — SODIUM CHLORIDE, PRESERVATIVE FREE 10 ML: 5 INJECTION INTRAVENOUS at 07:59

## 2025-05-04 RX ADMIN — POTASSIUM CHLORIDE 40 MEQ: 750 TABLET, FILM COATED, EXTENDED RELEASE ORAL at 20:36

## 2025-05-04 RX ADMIN — DEXTRAN 70, GLYCERIN, HYPROMELLOSE 1 DROP: 1; 2; 3 SOLUTION/ DROPS OPHTHALMIC at 20:19

## 2025-05-04 RX ADMIN — SUMATRIPTAN SUCCINATE 50 MG: 25 TABLET ORAL at 14:35

## 2025-05-04 RX ADMIN — BUTALBITAL, ACETAMINOPHEN, AND CAFFEINE 1 TABLET: 325; 50; 40 TABLET ORAL at 09:04

## 2025-05-04 RX ADMIN — BUDESONIDE 500 MCG: 0.5 INHALANT RESPIRATORY (INHALATION) at 08:24

## 2025-05-04 RX ADMIN — FENOFIBRATE 160 MG: 160 TABLET ORAL at 08:00

## 2025-05-04 RX ADMIN — FLECAINIDE ACETATE 100 MG: 50 TABLET ORAL at 08:00

## 2025-05-04 RX ADMIN — AMIODARONE HYDROCHLORIDE 0.5 MG/MIN: 1.8 INJECTION, SOLUTION INTRAVENOUS at 17:31

## 2025-05-04 RX ADMIN — ACETAMINOPHEN 650 MG: 325 TABLET ORAL at 06:02

## 2025-05-04 RX ADMIN — METOPROLOL TARTRATE 5 MG: 5 INJECTION INTRAVENOUS at 19:30

## 2025-05-04 RX ADMIN — APIXABAN 5 MG: 5 TABLET, FILM COATED ORAL at 20:36

## 2025-05-04 RX ADMIN — BUDESONIDE 500 MCG: 0.5 INHALANT RESPIRATORY (INHALATION) at 20:41

## 2025-05-04 RX ADMIN — DILTIAZEM HYDROCHLORIDE 120 MG: 120 CAPSULE, EXTENDED RELEASE ORAL at 11:39

## 2025-05-04 RX ADMIN — ONDANSETRON 4 MG: 2 INJECTION INTRAMUSCULAR; INTRAVENOUS at 07:57

## 2025-05-04 RX ADMIN — AMIODARONE HYDROCHLORIDE 150 MG: 50 INJECTION, SOLUTION INTRAVENOUS at 11:36

## 2025-05-04 RX ADMIN — SODIUM CHLORIDE, PRESERVATIVE FREE 10 ML: 5 INJECTION INTRAVENOUS at 20:36

## 2025-05-04 RX ADMIN — APIXABAN 5 MG: 5 TABLET, FILM COATED ORAL at 08:00

## 2025-05-04 RX ADMIN — MONTELUKAST 10 MG: 10 TABLET, FILM COATED ORAL at 20:36

## 2025-05-04 RX ADMIN — AMIODARONE HYDROCHLORIDE 1 MG/MIN: 1.8 INJECTION, SOLUTION INTRAVENOUS at 11:49

## 2025-05-04 RX ADMIN — CALCIUM GLUCONATE 1000 MG: 20 INJECTION, SOLUTION INTRAVENOUS at 11:38

## 2025-05-04 RX ADMIN — POLYETHYLENE GLYCOL 3350 17 G: 17 POWDER, FOR SOLUTION ORAL at 06:02

## 2025-05-04 RX ADMIN — LEVOTHYROXINE SODIUM 25 MCG: 0.05 TABLET ORAL at 06:02

## 2025-05-04 ASSESSMENT — PAIN SCALES - GENERAL
PAINLEVEL_OUTOF10: 7
PAINLEVEL_OUTOF10: 5
PAINLEVEL_OUTOF10: 3
PAINLEVEL_OUTOF10: 0
PAINLEVEL_OUTOF10: 5
PAINLEVEL_OUTOF10: 7
PAINLEVEL_OUTOF10: 0

## 2025-05-04 ASSESSMENT — PAIN DESCRIPTION - LOCATION
LOCATION: HEAD

## 2025-05-04 ASSESSMENT — PAIN DESCRIPTION - DESCRIPTORS
DESCRIPTORS: ACHING

## 2025-05-04 ASSESSMENT — PAIN DESCRIPTION - ORIENTATION
ORIENTATION: RIGHT;LEFT
ORIENTATION: MID

## 2025-05-04 ASSESSMENT — PAIN - FUNCTIONAL ASSESSMENT
PAIN_FUNCTIONAL_ASSESSMENT: ACTIVITIES ARE NOT PREVENTED
PAIN_FUNCTIONAL_ASSESSMENT: ACTIVITIES ARE NOT PREVENTED

## 2025-05-04 NOTE — FLOWSHEET NOTE
Patient tachycardic to the 160s. Offers palpitations, nausea, dizziness. Denies recent exertion. Amio gtt rate recently decreased to 0.5 mg/min.     Patient is alert and orientated w/ good insight, diaphoretic, nml WOB, CTA, S1/S2, irregularly irregular rhythm, no edema. EKG w/ afib RVR, PVCs, RBBB. Trop 7. Not hypoxic. Adequate UOP.    Symptomatic and recurrent afib RVR.    Check labs and replete as needed. Metoprolol IVP per parameters. Increase amio rate back to 1.0 mg/min. Will wean as able as patient to transition to PO in AM. Ablation planned for this week. Cards following.

## 2025-05-04 NOTE — PROGRESS NOTES
1915- verbal report given at bedside to SHRUTHI Govea. Both Rns respond to pt suddenly SOB with HR 170s. Pt reports feeling \"not well\". EKG done, shows afib rvr. MD Eldon notified. Night MD at bedside to revaluate patient shortly after speaking to Dr Colón. See note/charting from night RN for further intervention.

## 2025-05-04 NOTE — CONSULTS
[facility] CARDIOLOGY  Cardiology Note     [x]Initial Encounter     []Follow-up    Patient Name: Gail Amaya - :1950 - MRN:[id number]  Primary Cardiologist: CAROL ANN Fisher  Consulting Cardiologist: Alee Argueta MD, [facility], [facility]     Reason for consult:  Atrial Fibrillation with RVR    HPI:  74-year-old woman with a history of atrial fibrillation, right bundle branch block, hypothyroidism, severe asthma, hypertension presenting with recurrent atrial fibrillation with RVR.  Patient is followed by S was recently admitted to [facility] on  for A-fib with RVR.  She converted to sinus rhythm was discharged home.  Scheduled for A-fib ablation with Dr. Fisher on May 20th.  Reportedly her flecainide was increased from 50 to 100 mg twice daily.  Metoprolol was discontinued and she was started on diltiazem 120 mg daily.  Fortunately 6 hours after discharge she went right back into atrial fibrillation with RVR.  She requested [facility] was brought to [facility] instead.  EKG demonstrated evidence of A-fib with right bundle branch block and rapid rates up to 162 bpm.  She was started on diltiazem drip.  Now converted to normal sinus rhythm.  Currently denies of any chest pain, shortness of breath or palpitations in sinus rhythm.    SUBJECTIVE:  Currently denies of any chest pain, shortness of breath or palpitations in sinus rhythm.     Assessment and Plan     Atrial fibrillation with RVR  Atrial fibrillation with RVR with evidence of right bundle branch block QRS duration 156 ms.  - Multiple hospitalization now for A-fib with RVR, significantly symptomatic.  Scheduled for A-fib ablation with Dr. Fisher on May 20  -Given right bundle branch block at baseline, 1C agent such as flecainide is not ideal.  Furthermore she has had ongoing breakthrough symptomatic atrial fibrillation despite increased dose of flecainide therapy, would best benefit from an alternative antiarrhythmic agent  - Spoke to the  Atraumatic, normocephalic  Eyes:  extraocular muscles intact  Neck:  Supple, normal range of motion  Lungs:  Clear to auscultation bilaterally, no wheezes/rales/rhonchi   Cardiovascular: RRR, nl S1-S2, no murmurs/rubs/gallops  Abdomen:  Soft, nontender, nondistended, normoactive bowel sounds  Skin:  Intact, no rash  Extremities:, no clubbing, cyanosis, or edema  Musculoskeletal: normal range of motion  Neurological:  Alert and oriented, no focal neurologic deficits  Psychiatric:  Normal mood and affect        Data Review:     Radiology:   XR Results (most recent):  @BSHSILASTIMGCAT([id number]:1)@  CT Result (most recent):  No results found for this or any previous visit from the past 3650 days.    MRI Result (most recent):  No results found for this or any previous visit from the past 3650 days.      No results for input(s): \"CPK\" in the last 72 hours.    Invalid input(s): \"CKQMB\", \"CPKMB\", \"TROIQ\", \"BMPP\"  Recent Labs     05/03/25  0246 05/04/25  0553    141   K 3.7 3.6   * 112*   CO2 19* 23   BUN 10 10   PHOS 1.3*  --      Recent Labs     05/02/25  2259 05/03/25  0246   WBC 11.5* 10.1   HGB 15.1 14.1   HCT 44.4 42.5    318     No results for input(s): \"INR\" in the last 72 hours.    Invalid input(s): \"PTTP\", \"PTP\", \"GPT\", \"SGOT\", \"AP\"  No results for input(s): \"CHOL\", \"HDLC\", \"LDLC\", \"HBA1C\" in the last 72 hours.    Invalid input(s): \"TGL\"  No results for input(s): \"ESR\", \"CRP\", \"TSH\" in the last 72 hours.        Current meds:    Current Facility-Administered Medications:     butalbital-acetaminophen-caffeine (FIORICET, ESGIC) per tablet 1 tablet, 1 tablet, Oral, Q4H PRN, Jerald Colón MD, 1 tablet at 05/04/25 0904    calcium gluconate 1,000 mg in sodium chloride 50 mL, 1,000 mg, IntraVENous, Once, Alee Argueta MD    apixaban (ELIQUIS) tablet 5 mg, 5 mg, Oral, BID, Tran Solares MD, 5 mg at 05/04/25 0800    artificial tears (dextran-hypromellose-glycerin) ophthalmic solution 1 drop, 1 drop,

## 2025-05-04 NOTE — PROGRESS NOTES
Hospitalist Progress Note      NAME:  Gail Amaya   :  1950  MRM:  [id number]    Date/Time: 2025  7:44 AM           Assessment / Plan:     Gail Amaya is a 74 y.o. female with atrial fibrillation, asthma, HTN, RA, hypothyroidism, peripheral neuropathy, and hyperlipidemia.  She was admitted on 2025 for evaluation/management of atrial fibrillation with RVR.    #Atrial fibrillation with RVR: reason for admission.  Converted to NSR within a few hours of starting diltiazem drip.  Follows with Dr. Fisher (at [facility]) as an outpatient.  Recently admitted at [facility] for the same and is scheduled for ablation with Dr. Fisher on .  Presented here after feeling very poorly and having a syncopal event in the setting of RVR.  -Cardiology consulted-- I d/w Dr. Argueta at bedside  -Start amiodarone IV x 24h then transition to PO  -Stop diltiazem drip, start diltiazem PO  -Stop home flecainide  -Dr. Argueta d/w Dr. Fisher (pt's outpatient EP doc)-- her ablation has been moved up to Friday, May 9.  Anticipate discharge prior to that date if symptoms/rhythm are stable  -Continue telemetry  -Continue home apixaban     #Elevated troponin: present on admission but trending down now.  This is not NSTEMI-- rather, this was 2/2 afib with RVR.    #Migraine  -Start migraine meds PRN    #Abnormal chest imaging: Present on admission-- CXR showed atelectasis.  Procalcitonin negative-- low suspicion for PNA.  -Hold off on further antibiotics    #SIRS: present on admission with elevated HR and elevated RR-- now not consistently meeting criteria.  Low suspicion for infection at this point.  -Follow blood cultures collected on admission    #Hypokalemia: present on admission, improved.    #JEANETTE: present on admission, resolved.  Likely 2/2 impaired kidney function during afib with RVR.  -Afib tx as above    #Prediabetes with hyperglycemia: A1C 6.2%

## 2025-05-05 LAB
ANION GAP SERPL CALC-SCNC: 5 MMOL/L (ref 2–12)
BUN SERPL-MCNC: 11 MG/DL (ref 6–20)
BUN/CREAT SERPL: 14 (ref 12–20)
CALCIUM SERPL-MCNC: 9 MG/DL (ref 8.5–10.1)
CHLORIDE SERPL-SCNC: 109 MMOL/L (ref 97–108)
CO2 SERPL-SCNC: 25 MMOL/L (ref 21–32)
COMMENT:: NORMAL
CREAT SERPL-MCNC: 0.76 MG/DL (ref 0.55–1.02)
ERYTHROCYTE [DISTWIDTH] IN BLOOD BY AUTOMATED COUNT: 13.4 % (ref 11.5–14.5)
GLUCOSE SERPL-MCNC: 118 MG/DL (ref 65–100)
HCT VFR BLD AUTO: 41.3 % (ref 35–47)
HGB BLD-MCNC: 13.5 G/DL (ref 11.5–16)
MAGNESIUM SERPL-MCNC: 2 MG/DL (ref 1.6–2.4)
MCH RBC QN AUTO: 31.3 PG (ref 26–34)
MCHC RBC AUTO-ENTMCNC: 32.7 G/DL (ref 30–36.5)
MCV RBC AUTO: 95.8 FL (ref 80–99)
NRBC # BLD: 0 K/UL (ref 0–0.01)
NRBC BLD-RTO: 0 PER 100 WBC
PHOSPHATE SERPL-MCNC: 2.7 MG/DL (ref 2.6–4.7)
PLATELET # BLD AUTO: 310 K/UL (ref 150–400)
PMV BLD AUTO: 9.8 FL (ref 8.9–12.9)
POTASSIUM SERPL-SCNC: 4.1 MMOL/L (ref 3.5–5.1)
RBC # BLD AUTO: 4.31 M/UL (ref 3.8–5.2)
SODIUM SERPL-SCNC: 139 MMOL/L (ref 136–145)
SPECIMEN HOLD: NORMAL
WBC # BLD AUTO: 7.4 K/UL (ref 3.6–11)

## 2025-05-05 PROCEDURE — 80048 BASIC METABOLIC PNL TOTAL CA: CPT

## 2025-05-05 PROCEDURE — 6370000000 HC RX 637 (ALT 250 FOR IP): Performed by: HOSPITALIST

## 2025-05-05 PROCEDURE — APPSS30 APP SPLIT SHARED TIME 16-30 MINUTES: Performed by: NURSE PRACTITIONER

## 2025-05-05 PROCEDURE — 85027 COMPLETE CBC AUTOMATED: CPT

## 2025-05-05 PROCEDURE — 6370000000 HC RX 637 (ALT 250 FOR IP): Performed by: INTERNAL MEDICINE

## 2025-05-05 PROCEDURE — 6360000002 HC RX W HCPCS: Performed by: HOSPITALIST

## 2025-05-05 PROCEDURE — 84100 ASSAY OF PHOSPHORUS: CPT

## 2025-05-05 PROCEDURE — 2500000003 HC RX 250 WO HCPCS: Performed by: HOSPITALIST

## 2025-05-05 PROCEDURE — 2060000000 HC ICU INTERMEDIATE R&B

## 2025-05-05 PROCEDURE — 94761 N-INVAS EAR/PLS OXIMETRY MLT: CPT

## 2025-05-05 PROCEDURE — 83735 ASSAY OF MAGNESIUM: CPT

## 2025-05-05 PROCEDURE — 2500000003 HC RX 250 WO HCPCS: Performed by: INTERNAL MEDICINE

## 2025-05-05 PROCEDURE — 94640 AIRWAY INHALATION TREATMENT: CPT

## 2025-05-05 PROCEDURE — 36415 COLL VENOUS BLD VENIPUNCTURE: CPT

## 2025-05-05 PROCEDURE — 99232 SBSQ HOSP IP/OBS MODERATE 35: CPT | Performed by: SPECIALIST

## 2025-05-05 RX ADMIN — MONTELUKAST 10 MG: 10 TABLET, FILM COATED ORAL at 21:15

## 2025-05-05 RX ADMIN — DEXTRAN 70, GLYCERIN, HYPROMELLOSE 1 DROP: 1; 2; 3 SOLUTION/ DROPS OPHTHALMIC at 21:10

## 2025-05-05 RX ADMIN — APIXABAN 5 MG: 5 TABLET, FILM COATED ORAL at 09:18

## 2025-05-05 RX ADMIN — POLYETHYLENE GLYCOL 3350 17 G: 17 POWDER, FOR SOLUTION ORAL at 09:35

## 2025-05-05 RX ADMIN — APIXABAN 5 MG: 5 TABLET, FILM COATED ORAL at 21:15

## 2025-05-05 RX ADMIN — AMIODARONE HYDROCHLORIDE 400 MG: 200 TABLET ORAL at 21:15

## 2025-05-05 RX ADMIN — BUDESONIDE 500 MCG: 0.5 INHALANT RESPIRATORY (INHALATION) at 19:23

## 2025-05-05 RX ADMIN — AMIODARONE HYDROCHLORIDE 1 MG/MIN: 1.8 INJECTION, SOLUTION INTRAVENOUS at 00:51

## 2025-05-05 RX ADMIN — AMIODARONE HYDROCHLORIDE 400 MG: 200 TABLET ORAL at 09:18

## 2025-05-05 RX ADMIN — FENOFIBRATE 160 MG: 160 TABLET ORAL at 09:34

## 2025-05-05 RX ADMIN — LEVOTHYROXINE SODIUM 25 MCG: 0.05 TABLET ORAL at 05:44

## 2025-05-05 RX ADMIN — BUDESONIDE 500 MCG: 0.5 INHALANT RESPIRATORY (INHALATION) at 08:27

## 2025-05-05 RX ADMIN — DEXTRAN 70, GLYCERIN, HYPROMELLOSE 1 DROP: 1; 2; 3 SOLUTION/ DROPS OPHTHALMIC at 09:18

## 2025-05-05 RX ADMIN — POTASSIUM CHLORIDE 20 MEQ: 750 TABLET, FILM COATED, EXTENDED RELEASE ORAL at 09:18

## 2025-05-05 RX ADMIN — DILTIAZEM HYDROCHLORIDE 120 MG: 120 CAPSULE, EXTENDED RELEASE ORAL at 09:18

## 2025-05-05 RX ADMIN — SODIUM CHLORIDE, PRESERVATIVE FREE 10 ML: 5 INJECTION INTRAVENOUS at 21:16

## 2025-05-05 ASSESSMENT — PAIN SCALES - GENERAL: PAINLEVEL_OUTOF10: 0

## 2025-05-05 NOTE — PROGRESS NOTES
[facility] CARDIOLOGY  Cardiology Note     []Initial Encounter     [x]Follow-up    Patient Name: Gail Amaya - :1950 - MRN:[id number]  Primary Cardiologist: CAROL ANN Fisher  Consulting Cardiologist: Alee Argueta MD, [facility], [facility]     Reason for consult:  Atrial Fibrillation with RVR    HPI:  74-year-old woman with a history of atrial fibrillation, right bundle branch block, hypothyroidism, severe asthma, hypertension presenting with recurrent atrial fibrillation with RVR.  Patient is followed by S was recently admitted to [facility] on  for A-fib with RVR.  She converted to sinus rhythm was discharged home.  Scheduled for A-fib ablation with Dr. Fisher on May 20th.  Reportedly her flecainide was increased from 50 to 100 mg twice daily.  Metoprolol was discontinued and she was started on diltiazem 120 mg daily.  Fortunately 6 hours after discharge she went right back into atrial fibrillation with RVR.  She requested [facility] was brought to [facility] instead.  EKG demonstrated evidence of A-fib with right bundle branch block and rapid rates up to 162 bpm.  She was started on diltiazem drip.  Now converted to normal sinus rhythm.  Currently denies of any chest pain, shortness of breath or palpitations in sinus rhythm.    SUBJECTIVE:  Currently denies of any chest pain, shortness of breath or palpitations.      Assessment and Plan     Atrial fibrillation with RVR  Atrial fibrillation with RVR with evidence of right bundle branch block QRS duration 156 ms.  - Multiple hospitalization now for A-fib with RVR, significantly symptomatic.    Scheduled for A-fib ablation with Dr. Fisher this Friday   -Given right bundle branch block at baseline, 1C agent such as flecainide is not ideal.  Furthermore she has had ongoing breakthrough symptomatic atrial fibrillation despite increased dose of flecainide therapy, would best benefit from an alternative antiarrhythmic agent  -   agreed on using an  maternal aunt; Colon Cancer in her mother; Pancreatic Cancer in her mother.  Allergies   Allergen Reactions    Oxycodone-Acetaminophen Dizziness or Vertigo, Headaches, Itching, Myalgia, Nausea Only, Nausea And Vomiting, Palpitations, Shortness Of Breath and Swelling    Penicillins Anaphylaxis     Other Reaction(s): All cillins  swelling rash    Allergic to all \"cillin\". Was tested and told it could be fatal. Tongue swelling/rash/cannot breath/throat tightness/puffiness of face/itching.      Other Reaction(s): swelling, hives, CAN'T BREATHE    Allergic to all \"cillin\". Was tested and told it could be fatal. Tongue swelling/rash/cannot breath/throat tightness/puffiness of face/itching.    Statins Itching, Myalgia, Other (See Comments) and Shortness Of Breath     Joint pain/swelling/cramps.    Sulfamethoxazole-Trimethoprim Headaches, Hives, Itching, Palpitations, Rash, Shortness Of Breath, Swelling and Other (See Comments)    Miconazole     Misc. Sulfonamide Containing Compounds     Sulfa Antibiotics      Other Reaction(s): HIVES, ITCHING, SWELLING    Trospium Chloride Itching and Swelling    Monistat Care Itching and Rash    Olmesartan Myalgia and Rash     Other Reaction(s): ACHING    Oxycodone Nausea And Vomiting    Tioconazole Dermatitis, Hives and Rash    Trospium Angioedema, Nausea And Vomiting and Other (See Comments)     Other Reaction(s): CHEST AND LEG PAIN          OBJECTIVE:  Wt Readings from Last 3 Encounters:   05/03/25 80.7 kg (178 lb)   01/22/25 79.4 kg (175 lb 1.6 oz)       Intake/Output Summary (Last 24 hours) at 5/5/2025 0822  Last data filed at 5/5/2025 0600  Gross per 24 hour   Intake 586.18 ml   Output 3600 ml   Net -3013.82 ml           Physical Exam    Vitals:   Vitals:    05/05/25 0300 05/05/25 0400 05/05/25 0500 05/05/25 0600   BP: 131/65 133/67 127/67 (!) 147/66   Pulse: 58 62 59 63   Resp: 16 13 15 18   Temp: 97.7 °F (36.5 °C)      TempSrc: Oral      SpO2: 94% 90% 95% 95%   Weight:

## 2025-05-05 NOTE — PROGRESS NOTES
Physician Progress Note      PATIENT:               GLORY PERALTA  CSN #:                  [id number]  :                       1950  ADMIT DATE:       2025 10:50 PM  DISCH DATE:  RESPONDING  PROVIDER #:        Cali Lo MD          QUERY TEXT:    Based on your medical judgment, please clarify these findings and document if   any of the following are being evaluated and/or treated:    The clinical indicators include:  - 74-year-old female with h/o HTN and Afib  - Eliquis 5 mg 2 times daily per MAR  Options provided:  -- Secondary hypercoagulable state in a patient with atrial fibrillation  -- Other - I will add my own diagnosis  -- Disagree - Not applicable / Not valid  -- Disagree - Clinically unable to determine / Unknown  -- Refer to Clinical Documentation Reviewer    PROVIDER RESPONSE TEXT:    This patient has secondary hypercoagulable state in a patient with atrial   fibrillation.    Query created by: Fartun Conway on 2025 9:24 AM      Electronically signed by:  Cali Lo MD 2025 9:28 AM

## 2025-05-05 NOTE — PLAN OF CARE
Problem: Chronic Conditions and Co-morbidities  Goal: Patient's chronic conditions and co-morbidity symptoms are monitored and maintained or improved  Outcome: Progressing     Problem: Respiratory - Adult  Goal: Achieves optimal ventilation and oxygenation  5/5/2025 0311 by Xuan Eaton RN  Outcome: Progressing  5/4/2025 2044 by Katerina Mensah RCP  Outcome: Progressing     Problem: Safety - Adult  Goal: Free from fall injury  Outcome: Progressing     Problem: ABCDS Injury Assessment  Goal: Absence of physical injury  Outcome: Progressing

## 2025-05-05 NOTE — PROGRESS NOTES
1915H- While receiving report from dayshift nurse, patient suddenly complains shortness of breathing and saying \"not feeling well\". Cadiac monitor shows Afib RVR heartrate of 170s. Immediately notified Caitie ACOSTA and Dr. Paz about what's happening.Dr. Paz verbalized to order the same last Dilt drip and it's last dosage nad run it together with the amiodarone drip if with enough IV access. He said to keep in touch with the Hospitalist on-duty since he's off working hours already. BERTHA Gallagher NP seen the patient bedside and verbalized to increase the Amiodarone drip back to 1mg/min and keep it for the whole night, and they will re-evaluate tomorrow morning. She said not to re-start the Dilt drip for now, but may give the PRN metoprolol dose and will re-evaluate if there will be a need of additional intervention.

## 2025-05-05 NOTE — PROGRESS NOTES
Hospitalist Progress Note      NAME:  Gail Amaya   :  1950  MRM:  [id number]    Date/Time: 2025  12:12 PM           Assessment / Plan:      74 y.o. female with atrial fibrillation, asthma, HTN, RA, hypothyroidism, peripheral neuropathy, and hyperlipidemia.  She was admitted on 2025 for evaluation/management of atrial fibrillation with RVR.    #Atrial fibrillation with RVR: reason for admission.  Converted to NSR within a few hours of starting diltiazem drip.  Follows with Dr. Fisher (at [facility]) as an outpatient.  Recently admitted at [facility] for the same and is scheduled for ablation with Dr. Fisher on .  Presented here after feeling very poorly and having a syncopal event in the setting of RVR.  -Cardiology consulted  Plan  -Start amiodarone IV x 24h then transition to PO, with possible plan to DC after 1 month post ablation  -Stop diltiazem drip, start diltiazem  mg daily  -Stop home flecainide  -Dr. Argueta d/w Dr. Fisher (pt's outpatient EP doc)-- her ablation has been moved up to Friday, May 9.  Anticipate discharge prior to that date if symptoms/rhythm are stable  -Continue telemetry  -Continue home apixaban     #Elevated troponin: present on admission but trending down now.  This is not NSTEMI-- rather, this was 2/2 afib with RVR.    #Migraine  -Start migraine meds PRN    #Abnormal chest imaging: Present on admission-- CXR showed atelectasis.  Procalcitonin negative-- low suspicion for PNA.  -Hold off on further antibiotics    #SIRS: present on admission with elevated HR and elevated RR-- now not consistently meeting criteria.  Low suspicion for infection at this point.  - Blood cultures no growth x 2 days    #Hypokalemia: present on admission, improved.    #JEANETTE: present on admission, resolved.  Likely 2/2 impaired kidney function during afib with RVR.  -Afib tx as above    #Prediabetes with hyperglycemia: A1C 6.2%

## 2025-05-06 PROBLEM — E03.9 HYPOTHYROIDISM: Status: ACTIVE | Noted: 2025-05-06

## 2025-05-06 PROBLEM — M06.9 RHEUMATOID ARTHRITIS (HCC): Status: ACTIVE | Noted: 2025-05-06

## 2025-05-06 PROBLEM — E78.5 DYSLIPIDEMIA: Status: ACTIVE | Noted: 2025-05-06

## 2025-05-06 LAB
ERYTHROCYTE [DISTWIDTH] IN BLOOD BY AUTOMATED COUNT: 13.2 % (ref 11.5–14.5)
HCT VFR BLD AUTO: 43.2 % (ref 35–47)
HGB BLD-MCNC: 14.5 G/DL (ref 11.5–16)
MCH RBC QN AUTO: 31.9 PG (ref 26–34)
MCHC RBC AUTO-ENTMCNC: 33.6 G/DL (ref 30–36.5)
MCV RBC AUTO: 94.9 FL (ref 80–99)
NRBC # BLD: 0 K/UL (ref 0–0.01)
NRBC BLD-RTO: 0 PER 100 WBC
PLATELET # BLD AUTO: 320 K/UL (ref 150–400)
PMV BLD AUTO: 9.7 FL (ref 8.9–12.9)
RBC # BLD AUTO: 4.55 M/UL (ref 3.8–5.2)
WBC # BLD AUTO: 9.1 K/UL (ref 3.6–11)

## 2025-05-06 PROCEDURE — 2500000003 HC RX 250 WO HCPCS: Performed by: HOSPITALIST

## 2025-05-06 PROCEDURE — 6370000000 HC RX 637 (ALT 250 FOR IP): Performed by: NURSE PRACTITIONER

## 2025-05-06 PROCEDURE — 99232 SBSQ HOSP IP/OBS MODERATE 35: CPT | Performed by: SPECIALIST

## 2025-05-06 PROCEDURE — 6370000000 HC RX 637 (ALT 250 FOR IP): Performed by: STUDENT IN AN ORGANIZED HEALTH CARE EDUCATION/TRAINING PROGRAM

## 2025-05-06 PROCEDURE — 6370000000 HC RX 637 (ALT 250 FOR IP): Performed by: HOSPITALIST

## 2025-05-06 PROCEDURE — 85027 COMPLETE CBC AUTOMATED: CPT

## 2025-05-06 PROCEDURE — APPSS30 APP SPLIT SHARED TIME 16-30 MINUTES: Performed by: NURSE PRACTITIONER

## 2025-05-06 PROCEDURE — 94640 AIRWAY INHALATION TREATMENT: CPT

## 2025-05-06 PROCEDURE — 6360000002 HC RX W HCPCS: Performed by: HOSPITALIST

## 2025-05-06 PROCEDURE — 94761 N-INVAS EAR/PLS OXIMETRY MLT: CPT

## 2025-05-06 PROCEDURE — 6370000000 HC RX 637 (ALT 250 FOR IP): Performed by: INTERNAL MEDICINE

## 2025-05-06 PROCEDURE — 36415 COLL VENOUS BLD VENIPUNCTURE: CPT

## 2025-05-06 PROCEDURE — 1100000000 HC RM PRIVATE

## 2025-05-06 RX ORDER — BISACODYL 10 MG
10 SUPPOSITORY, RECTAL RECTAL DAILY PRN
Status: DISCONTINUED | OUTPATIENT
Start: 2025-05-06 | End: 2025-05-09 | Stop reason: HOSPADM

## 2025-05-06 RX ORDER — POLYETHYLENE GLYCOL 3350 17 G/17G
17 POWDER, FOR SOLUTION ORAL 2 TIMES DAILY
Status: DISCONTINUED | OUTPATIENT
Start: 2025-05-06 | End: 2025-05-09 | Stop reason: HOSPADM

## 2025-05-06 RX ORDER — DILTIAZEM HYDROCHLORIDE 180 MG/1
180 CAPSULE, COATED, EXTENDED RELEASE ORAL DAILY
Status: DISCONTINUED | OUTPATIENT
Start: 2025-05-07 | End: 2025-05-08

## 2025-05-06 RX ADMIN — ONDANSETRON 4 MG: 2 INJECTION INTRAMUSCULAR; INTRAVENOUS at 13:53

## 2025-05-06 RX ADMIN — AMIODARONE HYDROCHLORIDE 400 MG: 200 TABLET ORAL at 20:35

## 2025-05-06 RX ADMIN — BUTALBITAL, ACETAMINOPHEN, AND CAFFEINE 1 TABLET: 325; 50; 40 TABLET ORAL at 13:57

## 2025-05-06 RX ADMIN — DILTIAZEM HYDROCHLORIDE 120 MG: 120 CAPSULE, EXTENDED RELEASE ORAL at 08:07

## 2025-05-06 RX ADMIN — APIXABAN 5 MG: 5 TABLET, FILM COATED ORAL at 20:35

## 2025-05-06 RX ADMIN — BUDESONIDE 500 MCG: 0.5 INHALANT RESPIRATORY (INHALATION) at 07:45

## 2025-05-06 RX ADMIN — DEXTRAN 70, GLYCERIN, HYPROMELLOSE 1 DROP: 1; 2; 3 SOLUTION/ DROPS OPHTHALMIC at 20:35

## 2025-05-06 RX ADMIN — ONDANSETRON 4 MG: 2 INJECTION INTRAMUSCULAR; INTRAVENOUS at 06:01

## 2025-05-06 RX ADMIN — MAGNESIUM HYDROXIDE 30 ML: 400 SUSPENSION ORAL at 19:00

## 2025-05-06 RX ADMIN — SODIUM CHLORIDE, PRESERVATIVE FREE 10 ML: 5 INJECTION INTRAVENOUS at 20:36

## 2025-05-06 RX ADMIN — FENOFIBRATE 160 MG: 160 TABLET ORAL at 08:07

## 2025-05-06 RX ADMIN — BUDESONIDE 500 MCG: 0.5 INHALANT RESPIRATORY (INHALATION) at 19:30

## 2025-05-06 RX ADMIN — POLYETHYLENE GLYCOL 3350 17 G: 17 POWDER, FOR SOLUTION ORAL at 08:08

## 2025-05-06 RX ADMIN — SODIUM CHLORIDE, PRESERVATIVE FREE 10 ML: 5 INJECTION INTRAVENOUS at 08:08

## 2025-05-06 RX ADMIN — BISACODYL 10 MG: 10 SUPPOSITORY RECTAL at 05:57

## 2025-05-06 RX ADMIN — BUTALBITAL, ACETAMINOPHEN, AND CAFFEINE 1 TABLET: 325; 50; 40 TABLET ORAL at 05:31

## 2025-05-06 RX ADMIN — ACETAMINOPHEN 650 MG: 325 TABLET ORAL at 12:17

## 2025-05-06 RX ADMIN — APIXABAN 5 MG: 5 TABLET, FILM COATED ORAL at 08:07

## 2025-05-06 RX ADMIN — MONTELUKAST 10 MG: 10 TABLET, FILM COATED ORAL at 20:35

## 2025-05-06 RX ADMIN — DEXTRAN 70, GLYCERIN, HYPROMELLOSE 1 DROP: 1; 2; 3 SOLUTION/ DROPS OPHTHALMIC at 10:11

## 2025-05-06 RX ADMIN — AMIODARONE HYDROCHLORIDE 400 MG: 200 TABLET ORAL at 08:07

## 2025-05-06 RX ADMIN — POLYETHYLENE GLYCOL 3350 17 G: 17 POWDER, FOR SOLUTION ORAL at 20:36

## 2025-05-06 RX ADMIN — POTASSIUM CHLORIDE 20 MEQ: 750 TABLET, FILM COATED, EXTENDED RELEASE ORAL at 08:07

## 2025-05-06 RX ADMIN — LEVOTHYROXINE SODIUM 25 MCG: 0.05 TABLET ORAL at 05:31

## 2025-05-06 ASSESSMENT — PAIN DESCRIPTION - LOCATION
LOCATION: HEAD

## 2025-05-06 ASSESSMENT — PAIN SCALES - GENERAL
PAINLEVEL_OUTOF10: 10
PAINLEVEL_OUTOF10: 3
PAINLEVEL_OUTOF10: 0
PAINLEVEL_OUTOF10: 3
PAINLEVEL_OUTOF10: 5

## 2025-05-06 ASSESSMENT — PAIN DESCRIPTION - DESCRIPTORS: DESCRIPTORS: ACHING

## 2025-05-06 NOTE — DISCHARGE INSTRUCTIONS
am : hold susan per Dr Fisher     [facility] Medicine DISCHARGE INSTRUCTIONS    NAME: Gail Amaya   :  1950   MRN:  [id number]     Date:     2025    Admission date: 2025     Discharge date:  2025     Reason for your admission:  Persistent atrial fibrillation (HCC) [I48.19]  Atrial fibrillation with RVR (HCC) [I48.91]  Sepsis, due to unspecified organism, unspecified whether acute organ dysfunction present (HCC) [A41.9]    Discharge Diagnoses:  As above    DISCHARGE INSTRUCTIONS:    Thank you for allowing us to participate in your care. Your discharging Hospitalist is Grayson Remy MD. You were admitted for evaluation and treatment for the above diagnoses.    Medications:     It is important that medications are taken exactly as they are prescribed on the discharge medication instructions and keep them your  in the bottles provided by the pharmacist.   Keep a list of the medication names, dosages, and times to be taken at all times.    Do not take other medications without consulting your doctor.     Recommended diet:  regular diet    Recommended activity: activity as tolerated    Post discharge care:    For questions regarding your Hospitalization or to contact the Hospital Medicine team, please call [phone].    Notify follow up health care provider or return to the emergency department if you cannot get hold of your doctor if you feel worse or experience symptoms similar to those that brought you to hospital    Rodolfo Rodriguez MD  [address] [facility] [id number] [phone]    Schedule an appointment as soon as possible for a visit  to schedule a regular follow up after discharge       Information obtained by :  I understand that if any problems occur once I am at home I am to contact my physician and I understand and acknowledge receipt of the instructions indicated above.

## 2025-05-06 NOTE — PROGRESS NOTES
[facility] CARDIOLOGY  Cardiology Note     []Initial Encounter     [x]Follow-up    Patient Name: Gail Amaya - :1950 - MRN:[id number]  Primary Cardiologist: CAROL ANN Fisher  Consulting Cardiologist: Alee Argueta MD, [facility], [facility], [place]     Reason for consult:  Atrial Fibrillation with RVR    HPI:  74-year-old woman with a history of atrial fibrillation, right bundle branch block, hypothyroidism, severe asthma, hypertension presenting with recurrent atrial fibrillation with RVR.  Patient is followed by S was recently admitted to [facility] on  for A-fib with RVR.  She converted to sinus rhythm was discharged home.  Scheduled for A-fib ablation with Dr. Fisher on May 20th.  Reportedly her flecainide was increased from 50 to 100 mg twice daily.  Metoprolol was discontinued and she was started on diltiazem 120 mg daily.  Fortunately 6 hours after discharge she went right back into atrial fibrillation with RVR.  She requested [facility] was brought to [facility] instead.  EKG demonstrated evidence of A-fib with right bundle branch block and rapid rates up to 162 bpm.  She was started on diltiazem drip.  Now converted to normal sinus rhythm.  Currently denies of any chest pain, shortness of breath or palpitations in sinus rhythm.    SUBJECTIVE:  Currently denies of any chest pain, shortness of breath or palpitations.      Assessment and Plan     Atrial fibrillation with RVR  Atrial fibrillation with RVR with evidence of right bundle branch block QRS duration 156 ms.  - Multiple hospitalization now for A-fib with RVR, significantly symptomatic.    Scheduled for A-fib ablation with Dr. Fisher this Friday   -Given right bundle branch block at baseline, 1C agent such as flecainide is not ideal.  Furthermore she has had ongoing breakthrough symptomatic atrial fibrillation despite increased dose of flecainide therapy, would best benefit from an alternative antiarrhythmic agent  -   agreed on using an  nondistended, normoactive bowel sounds  Skin:  Intact, no rash  Extremities:, no clubbing, cyanosis, or edema  Musculoskeletal: normal range of motion  Neurological:  Alert and oriented, no focal neurologic deficits  Psychiatric:  Normal mood and affect        Data Review:     Radiology:   XR Results (most recent):  @BSHSILASTIMGCAT([id number]:1)@  CT Result (most recent):  No results found for this or any previous visit from the past 3650 days.    MRI Result (most recent):  No results found for this or any previous visit from the past 3650 days.      No results for input(s): \"CPK\" in the last 72 hours.    Invalid input(s): \"CKQMB\", \"CPKMB\", \"TROIQ\", \"BMPP\"  Recent Labs     05/04/25  1931 05/05/25  0318    139   K 3.7 4.1    109*   CO2 23 25   BUN 12 11   PHOS  --  2.7     Recent Labs     05/05/25  0944 05/06/25  0550   WBC 7.4 9.1   HGB 13.5 14.5   HCT 41.3 43.2    320     No results for input(s): \"INR\" in the last 72 hours.    Invalid input(s): \"PTTP\", \"PTP\", \"GPT\", \"SGOT\", \"AP\"  No results for input(s): \"CHOL\", \"HDLC\", \"LDLC\", \"HBA1C\" in the last 72 hours.    Invalid input(s): \"TGL\"  No results for input(s): \"ESR\", \"CRP\", \"TSH\" in the last 72 hours.        Current meds:    Current Facility-Administered Medications:     bisacodyl (DULCOLAX) suppository 10 mg, 10 mg, Rectal, Daily PRN, Jessika Méndez APRN - NP, 10 mg at 05/06/25 0557    butalbital-acetaminophen-caffeine (FIORICET, ESGIC) per tablet 1 tablet, 1 tablet, Oral, Q4H PRN, Jerald Colón MD, 1 tablet at 05/06/25 0531    amiodarone (CORDARONE) tablet 400 mg, 400 mg, Oral, BID, Alee Argueta MD, 400 mg at 05/05/25 2115    dilTIAZem (CARDIZEM CD) extended release capsule 120 mg, 120 mg, Oral, Daily, Alee Argueta MD, 120 mg at 05/05/25 0918    ketorolac (TORADOL) injection 15 mg, 15 mg, IntraMUSCular, Q6H PRN, Jerald Colón MD    prochlorperazine (COMPAZINE) injection 10 mg, 10 mg, IntraVENous, Q6H PRN, Jerald Colón MD

## 2025-05-06 NOTE — PROGRESS NOTES
Hospitalist Progress Note      NAME:  Gail Amyaa   :  1950  MRM:  [id number]    Date/Time: 2025  10:58 AM           Assessment / Plan:      74 y.o. female with atrial fibrillation, asthma, HTN, RA, hypothyroidism, peripheral neuropathy, and hyperlipidemia.  She was admitted on 2025 for evaluation/management of atrial fibrillation with RVR.    #Atrial fibrillation with RVR: reason for admission.  Converted to NSR within a few hours of starting diltiazem drip.  Follows with Dr. Fisher (at [facility]) as an outpatient.  Recently admitted at [facility] for the same and is scheduled for ablation with Dr. Fisher on .  Presented here after feeling very poorly and having a syncopal event in the setting of RVR.  -Cardiology consulted  Plan  - S/p amiodarone IV x 24h then transition to PO, with possible plan to DC after 1 month post ablation  - S/p diltiazem drip, continue diltiazem  mg daily  -Stop home flecainide  -Dr. Argueta d/w Dr. Fisher (pt's outpatient EP doc)-- her ablation has been moved up to Friday, May 9.  Anticipate discharge prior to that date if symptoms/rhythm are stable  -Continue telemetry  -Continue home apixaban   Can DC tomorrow, as she prefers to stay over the day today will be monitored, she is not comfortable leaving today    #Elevated troponin: present on admission but trending down now.  This is not NSTEMI-- rather, this was 2/2 afib with RVR.    #Migraine  -Start migraine meds PRN    #Abnormal chest imaging: Present on admission-- CXR showed atelectasis.  Procalcitonin negative-- low suspicion for PNA.  -Hold off on further antibiotics    #SIRS: present on admission with elevated HR and elevated RR-- now not consistently meeting criteria.  Low suspicion for infection at this point.  - Blood cultures no growth x 2 days    #Hypokalemia: present on admission, improved.    #JEANETTE: present on admission, resolved.  Likely 2/2 impaired kidney

## 2025-05-07 LAB
EKG DIAGNOSIS: NORMAL
EKG Q-T INTERVAL: 240 MS
EKG QRS DURATION: 154 MS
EKG QTC CALCULATION (BAZETT): 400 MS
EKG R AXIS: 135 DEGREES
EKG T AXIS: -18 DEGREES
EKG VENTRICULAR RATE: 167 BPM
ERYTHROCYTE [DISTWIDTH] IN BLOOD BY AUTOMATED COUNT: 13.3 % (ref 11.5–14.5)
GLUCOSE BLD STRIP.AUTO-MCNC: 104 MG/DL (ref 65–117)
GLUCOSE BLD STRIP.AUTO-MCNC: 98 MG/DL (ref 65–117)
HCT VFR BLD AUTO: 44.5 % (ref 35–47)
HGB BLD-MCNC: 14.7 G/DL (ref 11.5–16)
MCH RBC QN AUTO: 31.3 PG (ref 26–34)
MCHC RBC AUTO-ENTMCNC: 33 G/DL (ref 30–36.5)
MCV RBC AUTO: 94.7 FL (ref 80–99)
NRBC # BLD: 0 K/UL (ref 0–0.01)
NRBC BLD-RTO: 0 PER 100 WBC
PLATELET # BLD AUTO: 358 K/UL (ref 150–400)
PMV BLD AUTO: 9.9 FL (ref 8.9–12.9)
RBC # BLD AUTO: 4.7 M/UL (ref 3.8–5.2)
SERVICE CMNT-IMP: NORMAL
SERVICE CMNT-IMP: NORMAL
WBC # BLD AUTO: 9.4 K/UL (ref 3.6–11)

## 2025-05-07 PROCEDURE — 93005 ELECTROCARDIOGRAM TRACING: CPT | Performed by: STUDENT IN AN ORGANIZED HEALTH CARE EDUCATION/TRAINING PROGRAM

## 2025-05-07 PROCEDURE — 6370000000 HC RX 637 (ALT 250 FOR IP): Performed by: SPECIALIST

## 2025-05-07 PROCEDURE — 94640 AIRWAY INHALATION TREATMENT: CPT

## 2025-05-07 PROCEDURE — 82962 GLUCOSE BLOOD TEST: CPT

## 2025-05-07 PROCEDURE — 2500000003 HC RX 250 WO HCPCS

## 2025-05-07 PROCEDURE — 36415 COLL VENOUS BLD VENIPUNCTURE: CPT

## 2025-05-07 PROCEDURE — 6360000002 HC RX W HCPCS: Performed by: HOSPITALIST

## 2025-05-07 PROCEDURE — 6370000000 HC RX 637 (ALT 250 FOR IP): Performed by: INTERNAL MEDICINE

## 2025-05-07 PROCEDURE — 2500000003 HC RX 250 WO HCPCS: Performed by: HOSPITALIST

## 2025-05-07 PROCEDURE — APPSS30 APP SPLIT SHARED TIME 16-30 MINUTES: Performed by: NURSE PRACTITIONER

## 2025-05-07 PROCEDURE — 6370000000 HC RX 637 (ALT 250 FOR IP): Performed by: HOSPITALIST

## 2025-05-07 PROCEDURE — 93010 ELECTROCARDIOGRAM REPORT: CPT | Performed by: SPECIALIST

## 2025-05-07 PROCEDURE — 99232 SBSQ HOSP IP/OBS MODERATE 35: CPT | Performed by: SPECIALIST

## 2025-05-07 PROCEDURE — 94761 N-INVAS EAR/PLS OXIMETRY MLT: CPT

## 2025-05-07 PROCEDURE — 6370000000 HC RX 637 (ALT 250 FOR IP): Performed by: STUDENT IN AN ORGANIZED HEALTH CARE EDUCATION/TRAINING PROGRAM

## 2025-05-07 PROCEDURE — 1100000000 HC RM PRIVATE

## 2025-05-07 PROCEDURE — 85027 COMPLETE CBC AUTOMATED: CPT

## 2025-05-07 RX ORDER — METOPROLOL TARTRATE 1 MG/ML
5 INJECTION, SOLUTION INTRAVENOUS ONCE
Status: COMPLETED | OUTPATIENT
Start: 2025-05-07 | End: 2025-05-07

## 2025-05-07 RX ADMIN — BUDESONIDE 500 MCG: 0.5 INHALANT RESPIRATORY (INHALATION) at 21:12

## 2025-05-07 RX ADMIN — POLYETHYLENE GLYCOL 3350 17 G: 17 POWDER, FOR SOLUTION ORAL at 07:55

## 2025-05-07 RX ADMIN — METOPROLOL TARTRATE 5 MG: 5 INJECTION INTRAVENOUS at 23:23

## 2025-05-07 RX ADMIN — POTASSIUM CHLORIDE 20 MEQ: 750 TABLET, FILM COATED, EXTENDED RELEASE ORAL at 07:55

## 2025-05-07 RX ADMIN — AMIODARONE HYDROCHLORIDE 400 MG: 200 TABLET ORAL at 07:55

## 2025-05-07 RX ADMIN — FENOFIBRATE 160 MG: 160 TABLET ORAL at 07:55

## 2025-05-07 RX ADMIN — DEXTRAN 70, GLYCERIN, HYPROMELLOSE 1 DROP: 1; 2; 3 SOLUTION/ DROPS OPHTHALMIC at 07:57

## 2025-05-07 RX ADMIN — LEVOTHYROXINE SODIUM 25 MCG: 0.05 TABLET ORAL at 05:48

## 2025-05-07 RX ADMIN — MONTELUKAST 10 MG: 10 TABLET, FILM COATED ORAL at 20:53

## 2025-05-07 RX ADMIN — SODIUM CHLORIDE, PRESERVATIVE FREE 10 ML: 5 INJECTION INTRAVENOUS at 20:53

## 2025-05-07 RX ADMIN — METOPROLOL TARTRATE 5 MG: 5 INJECTION INTRAVENOUS at 21:45

## 2025-05-07 RX ADMIN — AMIODARONE HYDROCHLORIDE 400 MG: 200 TABLET ORAL at 20:53

## 2025-05-07 RX ADMIN — POLYETHYLENE GLYCOL 3350 17 G: 17 POWDER, FOR SOLUTION ORAL at 21:07

## 2025-05-07 RX ADMIN — METOPROLOL TARTRATE 5 MG: 5 INJECTION INTRAVENOUS at 05:27

## 2025-05-07 RX ADMIN — APIXABAN 5 MG: 5 TABLET, FILM COATED ORAL at 07:55

## 2025-05-07 RX ADMIN — DILTIAZEM HYDROCHLORIDE 180 MG: 180 CAPSULE, EXTENDED RELEASE ORAL at 07:55

## 2025-05-07 RX ADMIN — APIXABAN 5 MG: 5 TABLET, FILM COATED ORAL at 20:53

## 2025-05-07 RX ADMIN — BUDESONIDE 500 MCG: 0.5 INHALANT RESPIRATORY (INHALATION) at 07:38

## 2025-05-07 RX ADMIN — ONDANSETRON 4 MG: 2 INJECTION INTRAMUSCULAR; INTRAVENOUS at 23:08

## 2025-05-07 RX ADMIN — DEXTRAN 70, GLYCERIN, HYPROMELLOSE 1 DROP: 1; 2; 3 SOLUTION/ DROPS OPHTHALMIC at 20:52

## 2025-05-07 ASSESSMENT — PAIN SCALES - GENERAL: PAINLEVEL_OUTOF10: 0

## 2025-05-07 NOTE — PROGRESS NOTES
without thrills or bruits, no significant peripheral pitting edema  Abd:  Soft, non-tender, non-distended  Musc:  No cyanosis or clubbing  Skin:  No rashes or ulcers on examined skin, normal color  Neuro:  follows commands appropriately, motor function grossly intact, no tremors  Psych:  Good insight, normal affect, calm/cooperative, not apparently responding to internal stimuli       Telemetry reviewed:   normal sinus rhythm    Medications Reviewed: (see below)    Lab Data Reviewed: (see below)    ______________________________________________________________________    Medications:     Current Facility-Administered Medications   Medication Dose Route Frequency    bisacodyl (DULCOLAX) suppository 10 mg  10 mg Rectal Daily PRN    dilTIAZem (CARDIZEM CD) extended release capsule 180 mg  180 mg Oral Daily    polyethylene glycol (GLYCOLAX) packet 17 g  17 g Oral BID    butalbital-acetaminophen-caffeine (FIORICET, ESGIC) per tablet 1 tablet  1 tablet Oral Q4H PRN    amiodarone (CORDARONE) tablet 400 mg  400 mg Oral BID    prochlorperazine (COMPAZINE) injection 10 mg  10 mg IntraVENous Q6H PRN    diphenhydrAMINE (BENADRYL) injection 12.5 mg  12.5 mg IntraVENous Q6H PRN    artificial tears (dextran-hypromellose-glycerin) ophthalmic solution 1 drop  1 drop Both Eyes Q12H    potassium chloride (KLOR-CON) extended release tablet 20 mEq  20 mEq Oral Daily    estradiol (CLIMARA) 0.1 MG/24HR 1 patch  1 patch TransDERmal Weekly    apixaban (ELIQUIS) tablet 5 mg  5 mg Oral BID    artificial tears (dextran-hypromellose-glycerin) ophthalmic solution 1 drop  1 drop Both Eyes Q4H PRN    fenofibrate tablet 160 mg  160 mg Oral Daily    levothyroxine (SYNTHROID) tablet 25 mcg  25 mcg Oral Daily    montelukast (SINGULAIR) tablet 10 mg  10 mg Oral Nightly    metoprolol (LOPRESSOR) injection 5 mg  5 mg IntraVENous Q6H PRN    sodium chloride flush 0.9 % injection 5-40 mL  5-40 mL IntraVENous 2 times per day    sodium chloride flush 0.9 %  injection 5-40 mL  5-40 mL IntraVENous PRN    0.9 % sodium chloride infusion   IntraVENous PRN    potassium chloride (KLOR-CON) extended release tablet 40 mEq  40 mEq Oral PRN    Or    potassium bicarb-citric acid (EFFER-K) effervescent tablet 40 mEq  40 mEq Oral PRN    Or    potassium chloride 10 mEq/100 mL IVPB (Peripheral Line)  10 mEq IntraVENous PRN    magnesium sulfate 2000 mg in 50 mL IVPB premix  2,000 mg IntraVENous PRN    ondansetron (ZOFRAN-ODT) disintegrating tablet 4 mg  4 mg Oral Q8H PRN    Or    ondansetron (ZOFRAN) injection 4 mg  4 mg IntraVENous Q6H PRN    acetaminophen (TYLENOL) tablet 650 mg  650 mg Oral Q6H PRN    Or    acetaminophen (TYLENOL) suppository 650 mg  650 mg Rectal Q6H PRN    budesonide (PULMICORT) nebulizer suspension 500 mcg  0.5 mg Nebulization BID RT    nitroGLYCERIN (NITROSTAT) SL tablet 0.4 mg  0.4 mg SubLINGual Q5 Min PRN            Lab Review:     Recent Labs     05/05/25  0944 05/06/25  0550 05/07/25  0606   WBC 7.4 9.1 9.4   HGB 13.5 14.5 14.7   HCT 41.3 43.2 44.5    320 358     Recent Labs     05/04/25  1931 05/05/25  0318    139   K 3.7 4.1    109*   CO2 23 25   BUN 12 11   MG 1.8 2.0   PHOS  --  2.7   ALT 65  --      No components found for: \"GLPOC\"    50 minutes of critical care time spent with the patient other procedures

## 2025-05-07 NOTE — PROGRESS NOTES
Rapid Called at 0519    Responded to RRT at 0519 for Tachycardia. HR in the 140's. Afib w RVR. PRN metoprolol given per MAR. Pt notes SOB and shaking d/t HR.     0548: HR 76. Pt feeling better. Shaking has stopped.     Provider at bedside: Provider Aware  Interventions ordered: EKG  Transfer to Higher Level of Care: na  Blood Glucose: 104       Vitals:    05/07/25 0515   BP: (!) 174/77   Pulse: (!) 130   Resp:    Temp: 98.2 °F (36.8 °C)   SpO2: 98%        Rapid Ended at 0550  RRT RN assisted with transport to accepting unit NA.    Davis Benz, RN

## 2025-05-07 NOTE — CARE COORDINATION
Care Management Progress Note    Reason for Admission:   Persistent atrial fibrillation (HCC) [I48.19]  Atrial fibrillation with RVR (HCC) [I48.91]  Sepsis, due to unspecified organism, unspecified whether acute organ dysfunction present (HCC) [A41.9]         Patient Admission Status: Inpatient  RUR: 9%  Hospitalization in the last 30 days (Readmission):  No        Transition of care plan:  Ongoing medical management.  AFIB with RVR.  Cards following.      Discharge Plan:    Anticipate no CM needs.    Date last IMM letter given: 5/4/2025    Outpatient follow-up:  TBD    Transport at discharge: Family      Will continue to follow for DC needs.    ______________________________________  WOODROW Elkins, RN-[facility]- Care Management  Available via [employer]  5/7/2025  1:54 PM

## 2025-05-07 NOTE — PROGRESS NOTES
estimated EF of 65 - 70%. Left ventricle size is normal. Normal wall thickness. Normal wall motion. Normal diastolic function.    Right Ventricle: Right ventricle size is normal. Normal systolic function.    Image quality is fair. Procedure performed with the patient in a sitting position. Periods of AFib with RVR noted by echo tech. Images obtained in SR.        Most recent HS troponins:  Recent Labs     05/04/25  1931   TROPHS 7       ECG: AF with RVR and aberrancy in RBBB pattern  Review of Systems    [x]All other systems reviewed and all negative except as written in HPI    [] Patient unable to provide secondary to condition         Past Medical History:   Diagnosis Date    Asthma     Atrial fibrillation (HCC)     Diabetes mellitus (HCC)     GERD (gastroesophageal reflux disease)     Hyperlipidemia     Hypertension     RA (rheumatoid arthritis) (HCC)     Sleep apnea     does not use CPAP    Thyroid disease     hypothyroid     Past Surgical History:   Procedure Laterality Date    BACK SURGERY      BLADDER SURGERY      \"tacking\"    CATARACT EXTRACTION Bilateral     HYSTERECTOMY (CERVIX STATUS UNKNOWN)      JOINT REPLACEMENT Right     KNEE CARTILAGE SURGERY Bilateral     TONSILLECTOMY      ULNAR TUNNEL RELEASE Right     UPPER GASTROINTESTINAL ENDOSCOPY N/A 1/22/2025    ESOPHAGOGASTRODUODENOSCOPY performed by Oj Roque MD at [facility] ENDOSCOPY     Social Hx:  reports that she has never smoked. She has never used smokeless tobacco. She reports that she does not currently use alcohol. She reports that she does not currently use drugs.  Family Hx: family history includes Breast Cancer in her maternal aunt; Colon Cancer in her mother; Pancreatic Cancer in her mother.  Allergies   Allergen Reactions    Oxycodone-Acetaminophen Dizziness or Vertigo, Headaches, Itching, Myalgia, Nausea Only, Nausea And Vomiting, Palpitations, Shortness Of Breath and Swelling    Penicillins Anaphylaxis     Other Reaction(s): All cillins  RRR, nl S1-S2, no murmurs/rubs/gallops  Abdomen:  Soft, nontender, nondistended, normoactive bowel sounds  Skin:  Intact, no rash  Extremities:, no clubbing, cyanosis, or edema  Musculoskeletal: normal range of motion  Neurological:  Alert and oriented, no focal neurologic deficits  Psychiatric:  Normal mood and affect        Data Review:     Radiology:   XR Results (most recent):  @BSHSILASTIMGCAT([id number]:1)@  CT Result (most recent):  No results found for this or any previous visit from the past 3650 days.    MRI Result (most recent):  No results found for this or any previous visit from the past 3650 days.      No results for input(s): \"CPK\" in the last 72 hours.    Invalid input(s): \"CKQMB\", \"CPKMB\", \"TROIQ\", \"BMPP\"  Recent Labs     05/04/25  1931 05/05/25  0318    139   K 3.7 4.1    109*   CO2 23 25   BUN 12 11   PHOS  --  2.7     Recent Labs     05/06/25  0550 05/07/25  0606   WBC 9.1 9.4   HGB 14.5 14.7   HCT 43.2 44.5    358     No results for input(s): \"INR\" in the last 72 hours.    Invalid input(s): \"PTTP\", \"PTP\", \"GPT\", \"SGOT\", \"AP\"  No results for input(s): \"CHOL\", \"HDLC\", \"LDLC\", \"HBA1C\" in the last 72 hours.    Invalid input(s): \"TGL\"  No results for input(s): \"ESR\", \"CRP\", \"TSH\" in the last 72 hours.        Current meds:    Current Facility-Administered Medications:     bisacodyl (DULCOLAX) suppository 10 mg, 10 mg, Rectal, Daily PRN, Jessika Méndez, APRN - NP, 10 mg at 05/06/25 0557    dilTIAZem (CARDIZEM CD) extended release capsule 180 mg, 180 mg, Oral, Daily, Miki Garces MD    polyethylene glycol (GLYCOLAX) packet 17 g, 17 g, Oral, BID, Cali Lo MD, 17 g at 05/06/25 2036    butalbital-acetaminophen-caffeine (FIORICET, ESGIC) per tablet 1 tablet, 1 tablet, Oral, Q4H PRN, Jerald Colón MD, 1 tablet at 05/06/25 1357    amiodarone (CORDARONE) tablet 400 mg, 400 mg, Oral, BID, Alee Argueta MD, 400 mg at 05/06/25 2035    prochlorperazine (COMPAZINE) injection 10 mg,

## 2025-05-08 LAB
ALBUMIN SERPL-MCNC: 3.4 G/DL (ref 3.5–5)
ALBUMIN/GLOB SERPL: 0.9 (ref 1.1–2.2)
ALP SERPL-CCNC: 49 U/L (ref 45–117)
ALT SERPL-CCNC: 54 U/L (ref 12–78)
ANION GAP SERPL CALC-SCNC: 6 MMOL/L (ref 2–12)
AST SERPL-CCNC: 32 U/L (ref 15–37)
BILIRUB SERPL-MCNC: 0.5 MG/DL (ref 0.2–1)
BUN SERPL-MCNC: 13 MG/DL (ref 6–20)
BUN/CREAT SERPL: 16 (ref 12–20)
CALCIUM SERPL-MCNC: 9.3 MG/DL (ref 8.5–10.1)
CHLORIDE SERPL-SCNC: 109 MMOL/L (ref 97–108)
CO2 SERPL-SCNC: 24 MMOL/L (ref 21–32)
CREAT SERPL-MCNC: 0.81 MG/DL (ref 0.55–1.02)
EKG ATRIAL RATE: 129 BPM
EKG ATRIAL RATE: 79 BPM
EKG DIAGNOSIS: NORMAL
EKG P AXIS: 82 DEGREES
EKG P-R INTERVAL: 120 MS
EKG P-R INTERVAL: 154 MS
EKG Q-T INTERVAL: 308 MS
EKG Q-T INTERVAL: 366 MS
EKG Q-T INTERVAL: 386 MS
EKG Q-T INTERVAL: 456 MS
EKG QRS DURATION: 134 MS
EKG QRS DURATION: 138 MS
EKG QRS DURATION: 150 MS
EKG QRS DURATION: 152 MS
EKG QTC CALCULATION (BAZETT): 497 MS
EKG QTC CALCULATION (BAZETT): 522 MS
EKG QTC CALCULATION (BAZETT): 532 MS
EKG QTC CALCULATION (BAZETT): 586 MS
EKG R AXIS: -24 DEGREES
EKG R AXIS: -25 DEGREES
EKG R AXIS: -29 DEGREES
EKG R AXIS: 74 DEGREES
EKG T AXIS: -17 DEGREES
EKG T AXIS: -4 DEGREES
EKG T AXIS: 40 DEGREES
EKG T AXIS: 58 DEGREES
EKG VENTRICULAR RATE: 114 BPM
EKG VENTRICULAR RATE: 154 BPM
EKG VENTRICULAR RATE: 157 BPM
EKG VENTRICULAR RATE: 79 BPM
ERYTHROCYTE [DISTWIDTH] IN BLOOD BY AUTOMATED COUNT: 13.3 % (ref 11.5–14.5)
GLOBULIN SER CALC-MCNC: 4 G/DL (ref 2–4)
GLUCOSE SERPL-MCNC: 127 MG/DL (ref 65–100)
HCT VFR BLD AUTO: 42.4 % (ref 35–47)
HGB BLD-MCNC: 14.2 G/DL (ref 11.5–16)
MCH RBC QN AUTO: 32 PG (ref 26–34)
MCHC RBC AUTO-ENTMCNC: 33.5 G/DL (ref 30–36.5)
MCV RBC AUTO: 95.5 FL (ref 80–99)
NRBC # BLD: 0 K/UL (ref 0–0.01)
NRBC BLD-RTO: 0 PER 100 WBC
PLATELET # BLD AUTO: 334 K/UL (ref 150–400)
PMV BLD AUTO: 9.7 FL (ref 8.9–12.9)
POTASSIUM SERPL-SCNC: 3.6 MMOL/L (ref 3.5–5.1)
PROT SERPL-MCNC: 7.4 G/DL (ref 6.4–8.2)
RBC # BLD AUTO: 4.44 M/UL (ref 3.8–5.2)
SODIUM SERPL-SCNC: 139 MMOL/L (ref 136–145)
TSH SERPL DL<=0.05 MIU/L-ACNC: 1.65 UIU/ML (ref 0.36–3.74)
WBC # BLD AUTO: 8.9 K/UL (ref 3.6–11)

## 2025-05-08 PROCEDURE — 2500000003 HC RX 250 WO HCPCS: Performed by: HOSPITALIST

## 2025-05-08 PROCEDURE — 6370000000 HC RX 637 (ALT 250 FOR IP): Performed by: INTERNAL MEDICINE

## 2025-05-08 PROCEDURE — 6360000002 HC RX W HCPCS: Performed by: HOSPITALIST

## 2025-05-08 PROCEDURE — 6370000000 HC RX 637 (ALT 250 FOR IP): Performed by: SPECIALIST

## 2025-05-08 PROCEDURE — 6370000000 HC RX 637 (ALT 250 FOR IP): Performed by: STUDENT IN AN ORGANIZED HEALTH CARE EDUCATION/TRAINING PROGRAM

## 2025-05-08 PROCEDURE — 93010 ELECTROCARDIOGRAM REPORT: CPT | Performed by: SPECIALIST

## 2025-05-08 PROCEDURE — 6370000000 HC RX 637 (ALT 250 FOR IP): Performed by: HOSPITALIST

## 2025-05-08 PROCEDURE — 84443 ASSAY THYROID STIM HORMONE: CPT

## 2025-05-08 PROCEDURE — 94761 N-INVAS EAR/PLS OXIMETRY MLT: CPT

## 2025-05-08 PROCEDURE — 85027 COMPLETE CBC AUTOMATED: CPT

## 2025-05-08 PROCEDURE — 36415 COLL VENOUS BLD VENIPUNCTURE: CPT

## 2025-05-08 PROCEDURE — APPSS30 APP SPLIT SHARED TIME 16-30 MINUTES: Performed by: NURSE PRACTITIONER

## 2025-05-08 PROCEDURE — 80053 COMPREHEN METABOLIC PANEL: CPT

## 2025-05-08 PROCEDURE — 1100000000 HC RM PRIVATE

## 2025-05-08 PROCEDURE — 94640 AIRWAY INHALATION TREATMENT: CPT

## 2025-05-08 PROCEDURE — 99232 SBSQ HOSP IP/OBS MODERATE 35: CPT | Performed by: SPECIALIST

## 2025-05-08 RX ORDER — DILTIAZEM HYDROCHLORIDE 180 MG/1
180 CAPSULE, COATED, EXTENDED RELEASE ORAL DAILY
Status: COMPLETED | OUTPATIENT
Start: 2025-05-08 | End: 2025-05-08

## 2025-05-08 RX ADMIN — BUDESONIDE 500 MCG: 0.5 INHALANT RESPIRATORY (INHALATION) at 20:37

## 2025-05-08 RX ADMIN — DEXTRAN 70, GLYCERIN, HYPROMELLOSE 1 DROP: 1; 2; 3 SOLUTION/ DROPS OPHTHALMIC at 20:37

## 2025-05-08 RX ADMIN — POLYETHYLENE GLYCOL 3350 17 G: 17 POWDER, FOR SOLUTION ORAL at 20:38

## 2025-05-08 RX ADMIN — DEXTRAN 70, GLYCERIN, HYPROMELLOSE 1 DROP: 1; 2; 3 SOLUTION/ DROPS OPHTHALMIC at 08:34

## 2025-05-08 RX ADMIN — AMIODARONE HYDROCHLORIDE 400 MG: 200 TABLET ORAL at 08:34

## 2025-05-08 RX ADMIN — MONTELUKAST 10 MG: 10 TABLET, FILM COATED ORAL at 20:38

## 2025-05-08 RX ADMIN — FENOFIBRATE 160 MG: 160 TABLET ORAL at 08:34

## 2025-05-08 RX ADMIN — DILTIAZEM HYDROCHLORIDE 180 MG: 180 CAPSULE, EXTENDED RELEASE ORAL at 08:34

## 2025-05-08 RX ADMIN — POLYETHYLENE GLYCOL 3350 17 G: 17 POWDER, FOR SOLUTION ORAL at 08:34

## 2025-05-08 RX ADMIN — SODIUM CHLORIDE, PRESERVATIVE FREE 10 ML: 5 INJECTION INTRAVENOUS at 08:34

## 2025-05-08 RX ADMIN — APIXABAN 5 MG: 5 TABLET, FILM COATED ORAL at 08:34

## 2025-05-08 RX ADMIN — AMIODARONE HYDROCHLORIDE 400 MG: 200 TABLET ORAL at 20:38

## 2025-05-08 RX ADMIN — METOPROLOL TARTRATE 5 MG: 5 INJECTION INTRAVENOUS at 21:25

## 2025-05-08 RX ADMIN — SODIUM CHLORIDE, PRESERVATIVE FREE 10 ML: 5 INJECTION INTRAVENOUS at 20:38

## 2025-05-08 RX ADMIN — LEVOTHYROXINE SODIUM 25 MCG: 0.05 TABLET ORAL at 06:08

## 2025-05-08 RX ADMIN — POTASSIUM CHLORIDE 20 MEQ: 750 TABLET, FILM COATED, EXTENDED RELEASE ORAL at 08:34

## 2025-05-08 RX ADMIN — BUDESONIDE 500 MCG: 0.5 INHALANT RESPIRATORY (INHALATION) at 07:55

## 2025-05-08 ASSESSMENT — PAIN SCALES - GENERAL: PAINLEVEL_OUTOF10: 0

## 2025-05-08 NOTE — PROGRESS NOTES
Rapid Called at 2305    Responded to RRT at 2308 for Tachycardia. HR in the 130's. Afib-RVR. Given metoprolol at 2145.  2324: HR 74    Provider at bedside: Provider Aware  Interventions ordered: EKG 5 mg Metoprolol.   Transfer to Higher Level of Care: na  Blood Glucose: 98       Vitals:    05/07/25 2324   BP: (!) 148/89   Pulse: 73   Resp: 14   Temp:    SpO2: 97%        Rapid Ended at 2328  RRT RN assisted with transport to accepting unit NA.    Davis Benz, RN

## 2025-05-08 NOTE — PROGRESS NOTES
[facility]  [address] [place] [address]  [phone]      Hospitalist  Progress Note      NAME:       Gail Amaya   :        1950  MRM:        [id number]    Date of service: 2025      Subjective: Patient seen and examined by me. Patient admitted with persistent A fib with episodic RVR. She had another episode last night. She denies any cough or chest pain. No SOB. No fever      Objective:    Vital Signs:    BP (!) 125/55   Pulse 63   Temp 97.7 °F (36.5 °C) (Oral)   Resp 16   Ht 1.6 m (5' 3\")   Wt 80.7 kg (178 lb)   SpO2 96%   BMI 31.53 kg/m²      No intake or output data in the 24 hours ending 25 0823     Current inpatient medications reviewed:  Current Facility-Administered Medications   Medication Dose Route Frequency    apixaban (ELIQUIS) tablet 5 mg  5 mg Oral BID    dilTIAZem (CARDIZEM CD) extended release capsule 180 mg  180 mg Oral Daily    bisacodyl (DULCOLAX) suppository 10 mg  10 mg Rectal Daily PRN    polyethylene glycol (GLYCOLAX) packet 17 g  17 g Oral BID    butalbital-acetaminophen-caffeine (FIORICET, ESGIC) per tablet 1 tablet  1 tablet Oral Q4H PRN    amiodarone (CORDARONE) tablet 400 mg  400 mg Oral BID    prochlorperazine (COMPAZINE) injection 10 mg  10 mg IntraVENous Q6H PRN    diphenhydrAMINE (BENADRYL) injection 12.5 mg  12.5 mg IntraVENous Q6H PRN    artificial tears (dextran-hypromellose-glycerin) ophthalmic solution 1 drop  1 drop Both Eyes Q12H    potassium chloride (KLOR-CON) extended release tablet 20 mEq  20 mEq Oral Daily    estradiol (CLIMARA) 0.1 MG/24HR 1 patch  1 patch TransDERmal Weekly    artificial tears (dextran-hypromellose-glycerin) ophthalmic solution 1 drop  1 drop Both Eyes Q4H PRN    fenofibrate tablet 160 mg  160 mg Oral Daily    levothyroxine (SYNTHROID) tablet 25 mcg  25 mcg Oral Daily    montelukast (SINGULAIR) tablet 10 mg  10 mg Oral  43.2 44.5 42.4   RBC 4.55 4.70 4.44   MCV 94.9 94.7 95.5   MCH 31.9 31.3 32.0    358 334     No results found for: \"LACTA\"  Recent Labs     05/08/25  0019      K 3.6   *   CO2 24   GLUCOSE 127*   BUN 13   CREATININE 0.81   CALCIUM 9.3   BILITOT 0.5   ALKPHOS 49   AST 32   ALT 54     No components found for: \"GLUCOSEPOC\"  No results found for: \"CHOL\", \"TRIG\", \"HDL\"    Imaging data reviewed:    XR CHEST PORTABLE  Result Date: 5/2/2025  Mild left basilar atelectasis. Electronically signed by Jann Bray    Imaging studies reviewed and reports noted, Telemetry reviewed and independently interpreted by me and available notes from other care providers - all reviewed by me on: 5/8/2025    Assessment and Plan:    Atrial fibrillation with RVR POA: she has had recurrent episodes of RVR, last one again last night. She is scheduled for A fib ablation tomorrow by Dr Fisher at [facility]. Given recurrent symptoms, she and her spouse have been understandably anxious to leave given the high change of recurrence. Will keep her in hospital overnight and plan to discharge her in AM to go for the planned ablation. Continue Amiodarone, Diltiazem and Apixaban (hold dose from tonight). Discussed with cardiology    Hypothyroidism POA: TSH is normal. Continue Levothyroxine    Dyslipidemia POA: continue Fenofibrate    Rheumatoid arthritis POA: stable. Outpatient follow up    Care Plan discussed with: Patient, Family, Nursing, CM     Prophylaxis:   Apixaban                Expected Disposition:  Home w/Family    PCP:      Rodolfo Rodriguez MD     I have personally examined and treated the patient at bedside during this period. To assist coordination of care and communication with nursing and staff, this note may be preliminary early in the day, but finalized by end of the day.         ___________________________________________________    Attending Physician:   Grayson Remy MD

## 2025-05-08 NOTE — PROGRESS NOTES
[facility] CARDIOLOGY  Cardiology Note     []Initial Encounter     [x]Follow-up    Patient Name: Gail Amaya - :1950 - MRN:[id number]  Primary Cardiologist: CAROL ANN Fisher  Consulting Cardiologist: Dr Miki Garces      Reason for consult:  Atrial Fibrillation with RVR    HPI:  74-year-old woman with a history of atrial fibrillation, right bundle branch block, hypothyroidism, severe asthma, hypertension presenting with recurrent atrial fibrillation with RVR.  Patient is followed by CAROL ANN was recently admitted to [facility] on  for A-fib with RVR.  She converted to sinus rhythm was discharged home.  Scheduled for A-fib ablation with Dr. Fisher on May 20th.  Reportedly her flecainide was increased from 50 to 100 mg twice daily.  Metoprolol was discontinued and she was started on diltiazem 120 mg daily.  Fortunately 6 hours after discharge she went right back into atrial fibrillation with RVR.  She requested [facility] was brought to [facility] instead.  EKG demonstrated evidence of A-fib with right bundle branch block and rapid rates up to 162 bpm.  She was started on diltiazem drip.  Now converted to normal sinus rhythm.  Currently denies of any chest pain, shortness of breath or palpitations in sinus rhythm.    SUBJECTIVE:  In SR this morning, AF w/ RVR overnight      Assessment and Plan     1.Atrial fibrillation with RVR   - Multiple hospitalization now for A-fib with RVR, significantly symptomatic.    Scheduled for A-fib ablation with Dr. Fisher this Friday   -Given right bundle branch block at baseline, 1C agent such as flecainide is not ideal.  Furthermore she has had ongoing breakthrough symptomatic atrial fibrillation despite increased dose of flecainide therapy, would best benefit from an alternative antiarrhythmic agent  -  agreed on using an alternative antiarrhythmic therapy as a bridge to ablation  - flecanide stopped   - increased diltiazem p.o. 180 mg daily  - cont   NP, 1 patch at 05/04/25 2158    artificial tears (dextran-hypromellose-glycerin) ophthalmic solution 1 drop, 1 drop, Both Eyes, Q4H PRN, Tran Solares MD    fenofibrate tablet 160 mg, 160 mg, Oral, Daily, Tran Solares MD, 160 mg at 05/08/25 0834    levothyroxine (SYNTHROID) tablet 25 mcg, 25 mcg, Oral, Daily, Tran Solares MD, 25 mcg at 05/08/25 0608    montelukast (SINGULAIR) tablet 10 mg, 10 mg, Oral, Nightly, Tran Solares MD, 10 mg at 05/07/25 2053    metoprolol (LOPRESSOR) injection 5 mg, 5 mg, IntraVENous, Q6H PRN, Tran Solares MD, 5 mg at 05/07/25 2145    sodium chloride flush 0.9 % injection 5-40 mL, 5-40 mL, IntraVENous, 2 times per day, Tran Solares MD, 10 mL at 05/08/25 0834    sodium chloride flush 0.9 % injection 5-40 mL, 5-40 mL, IntraVENous, PRN, Tran Solares MD    0.9 % sodium chloride infusion, , IntraVENous, PRN, Tran Solares MD    potassium chloride (KLOR-CON) extended release tablet 40 mEq, 40 mEq, Oral, PRN **OR** potassium bicarb-citric acid (EFFER-K) effervescent tablet 40 mEq, 40 mEq, Oral, PRN **OR** potassium chloride 10 mEq/100 mL IVPB (Peripheral Line), 10 mEq, IntraVENous, PRN, Tran Solares MD, Stopped at 05/03/25 0740    magnesium sulfate 2000 mg in 50 mL IVPB premix, 2,000 mg, IntraVENous, PRN, Tran Solares MD    ondansetron (ZOFRAN-ODT) disintegrating tablet 4 mg, 4 mg, Oral, Q8H PRN, 4 mg at 05/03/25 0242 **OR** ondansetron (ZOFRAN) injection 4 mg, 4 mg, IntraVENous, Q6H PRN, Tran Solares MD, 4 mg at 05/07/25 2308    acetaminophen (TYLENOL) tablet 650 mg, 650 mg, Oral, Q6H PRN, 650 mg at 05/06/25 1217 **OR** acetaminophen (TYLENOL) suppository 650 mg, 650 mg, Rectal, Q6H PRN, Tran Solares MD    budesonide (PULMICORT) nebulizer suspension 500 mcg, 0.5 mg, Nebulization, BID RT, Tran Solares MD, 500 mcg at 05/08/25 0755    nitroGLYCERIN (NITROSTAT) SL tablet 0.4 mg, 0.4 mg, SubLINGual, Q5 Min PRN, Tran Solares MD Susan Fisher, APRN -

## 2025-05-08 NOTE — PROGRESS NOTES
1200    Pharmacy unable to verify home med \"Xolair\" due to it being a sample. Medication given back to patient and  to be sent home MD aware.

## 2025-05-08 NOTE — PLAN OF CARE
Problem: Chronic Conditions and Co-morbidities  Goal: Patient's chronic conditions and co-morbidity symptoms are monitored and maintained or improved  Outcome: Progressing     Problem: Discharge Planning  Goal: Discharge to home or other facility with appropriate resources  Outcome: Progressing     Problem: Respiratory - Adult  Goal: Achieves optimal ventilation and oxygenation  5/8/2025 0525 by Rosalinda Beltrán RN  Outcome: Progressing  5/7/2025 2115 by Katerina Mensah RCP  Outcome: Progressing     Problem: Safety - Adult  Goal: Free from fall injury  Outcome: Progressing     Problem: ABCDS Injury Assessment  Goal: Absence of physical injury  Outcome: Progressing     Problem: Pain  Goal: Verbalizes/displays adequate comfort level or baseline comfort level  Outcome: Progressing

## 2025-05-08 NOTE — FLOWSHEET NOTE
Patient tachycardic. EKG w/ afib RVR. /84. Received amio and dilt as scheduled. Additional dose metoprolol IVP ordered. Continue Eliquis. Check CBC and CMP and replete as needed. TSH added to AM labs. Continue to monitor.

## 2025-05-09 VITALS
DIASTOLIC BLOOD PRESSURE: 90 MMHG | BODY MASS INDEX: 31.48 KG/M2 | HEART RATE: 139 BPM | SYSTOLIC BLOOD PRESSURE: 170 MMHG | RESPIRATION RATE: 18 BRPM | HEIGHT: 63 IN | TEMPERATURE: 97.5 F | OXYGEN SATURATION: 100 % | WEIGHT: 177.7 LBS

## 2025-05-09 LAB
BACTERIA SPEC CULT: NORMAL
BACTERIA SPEC CULT: NORMAL
SERVICE CMNT-IMP: NORMAL
SERVICE CMNT-IMP: NORMAL

## 2025-05-09 PROCEDURE — 2500000003 HC RX 250 WO HCPCS: Performed by: HOSPITALIST

## 2025-05-09 PROCEDURE — 6370000000 HC RX 637 (ALT 250 FOR IP): Performed by: HOSPITALIST

## 2025-05-09 PROCEDURE — 94761 N-INVAS EAR/PLS OXIMETRY MLT: CPT

## 2025-05-09 PROCEDURE — 6370000000 HC RX 637 (ALT 250 FOR IP): Performed by: INTERNAL MEDICINE

## 2025-05-09 RX ORDER — AMIODARONE HYDROCHLORIDE 400 MG/1
400 TABLET ORAL 2 TIMES DAILY
Qty: 60 TABLET | Refills: 0 | Status: SHIPPED
Start: 2025-05-09 | End: 2025-06-08

## 2025-05-09 RX ADMIN — AMIODARONE HYDROCHLORIDE 400 MG: 200 TABLET ORAL at 08:59

## 2025-05-09 RX ADMIN — SODIUM CHLORIDE, PRESERVATIVE FREE 10 ML: 5 INJECTION INTRAVENOUS at 09:20

## 2025-05-09 RX ADMIN — LEVOTHYROXINE SODIUM 25 MCG: 0.05 TABLET ORAL at 06:17

## 2025-05-09 RX ADMIN — METOPROLOL TARTRATE 5 MG: 5 INJECTION INTRAVENOUS at 10:14

## 2025-05-09 ASSESSMENT — PAIN SCALES - GENERAL: PAINLEVEL_OUTOF10: 0

## 2025-05-09 NOTE — PROGRESS NOTES
Spoke with Sherron,[phone], to notify of events, pt has ablation scheduled at [facility] today 1230-pt's  is uncomfortable with transporting patient independently,pt will be transported via medical transport set up by case management to get to appt, Sherron stated he understood and have assured nurse provider and providers nurse is aware of events.    1100-Spoke with Sherron, notified pt transportation is set for 1130 with [facility], pt is to be transported to central registration.

## 2025-05-09 NOTE — DISCHARGE SUMMARY
[facility]  [address] [place] [address]  Tel: [phone]    Hospital Medicine Discharge Summary    Patient ID:    Gail Amaya  Age:              74 y.o.    : 1950  MRN:             [id number]     PCP: Rodolfo Rodriguez MD     Date of Admission: 2025    Date of Discharge:  2025    Discharge Diagnoses:  Principal Problem:    Atrial fibrillation with RVR (HCC)    Hypothyroidism    Dyslipidemia    Rheumatoid arthritis (HCC)    Reason for admission:    Persistent atrial fibrillation (HCC) [I48.19]  Atrial fibrillation with RVR (HCC) [I48.91]  Sepsis, due to unspecified organism, unspecified whether acute organ dysfunction present (HCC) [A41.9]    Diagnostic testing:    Laboratory data reviewed and independently interpreted:    Recent Labs     25  0606 25  0019   WBC 9.4 8.9   HGB 14.7 14.2   HCT 44.5 42.4   RBC 4.70 4.44   MCV 94.7 95.5   MCH 31.3 32.0    334     No results found for: \"LACTA\"  Recent Labs     25  0019      K 3.6   *   CO2 24   GLUCOSE 127*   BUN 13   CREATININE 0.81   CALCIUM 9.3   BILITOT 0.5   ALKPHOS 49   AST 32   ALT 54     No components found for: \"GLUCOSEPOC\"  No results found for: \"CHOL\", \"TRIG\", \"HDL\"    Imaging data reviewed:    XR CHEST PORTABLE  Result Date: 2025  Mild left basilar atelectasis. Electronically signed by [facility]:     Ms. Amaya is a 74 y.o. admitted to [facility] and treated for the following:    Atrial fibrillation with RVR POA: she has had recurrent episodes of RVR, last one again last night. She is scheduled for A fib ablation today by Dr Fisher at [facility]. Given recurrent symptoms, she and her spouse have been understandably anxious to leave given the high change of recurrence. She had another recurrence last night but now stable. Discharged in stable condition and she will proceed to

## 2025-05-09 NOTE — PROGRESS NOTES
Rapid Called at 0957    Responded to RRT at 0958 for Tachycardia    Provider at bedside: Yes- MD Remy  Interventions ordered: Other (Comment) metop IV push PRN order  Sepsis Suspected: NA  Transfer to Higher Level of Care: already planned to transfer to [facility] for an ablation today    RRT called by telemetry due to tachycardia. Pt's HR was sustaining in the 140-180s.     Pt with a PMH of asthma, HTN, hypothyroid, RA, HLD, and afib with RVR was admitted for CP and SOB who was diagnosed with afib with RVR. Pt did [facility]     Upon assessment, pt appeared uncomfortable and was c/o not feeling well, shaking, and dizziness. VSS and MD Remy was notified. 5 mg IV metoprolol was given and HR came down to 1 teens-120. No other interventions at this time. Primary RN is to confirm transfer to [facility].     RRT verbally is released by primary RN.     Vitals:    05/09/25 1005   BP: (!) 170/90   Pulse: (!) 139   Resp:    Temp:    SpO2: 100%      Rapid Ended at 1020  RRT RN assisted with transport to accepting unit No.    Indra Howe, SHRUTHI

## 2025-05-09 NOTE — CARE COORDINATION
Case Management Discharge Note    Discharge Plan:  Patient discharging to [facility] today.  Patient is scheduled for an ablation at 1400.  Patient is required to be there by 1230.        Transportation at DC:  ALS p) er Attending.  Cardiac monitoring due to AFIB.  [facility] [phone].  Pick-up time:  1130.      (Off note, CM contacted [facility] to assist with transportation.  However, CM was informed that they can only do an ALS transport if [facility] denies this.  CM contacted [facility] and confirmed acceptance).       CM met with patient and patient's spouse at bedside to discuss transportation updates.  They are aware that ALS transportation has been ordered by our Attending.  CM informed them that they may get a transportation bill from Medicare, if in case their insurance does not cover it.  Patient & spouse stated full understanding.       Medical treatment team informed of updates.        Please reach out to CM if any discharge needs arise.    ______________________________________  WOODROW Elkins, RN-CM  [facility]- Care Management  Available via [employer]  5/9/2025  9:21 AM

## 2025-05-09 NOTE — PLAN OF CARE
Problem: Chronic Conditions and Co-morbidities  Goal: Patient's chronic conditions and co-morbidity symptoms are monitored and maintained or improved  Outcome: Progressing     Problem: Discharge Planning  Goal: Discharge to home or other facility with appropriate resources  Outcome: Progressing     Problem: Respiratory - Adult  Goal: Achieves optimal ventilation and oxygenation  5/9/2025 0130 by Rut Victoria RN  Outcome: Progressing     Problem: Safety - Adult  Goal: Free from fall injury  Outcome: Progressing

## 2025-05-09 NOTE — FLOWSHEET NOTE
05/08/25 2115   Vital Signs   Pulse (!) 135  (patient heart rate sustained above 130s, pt sysmptomatic, received Metoprolol 5mg IVP, will continue to monitor.)   BP (!) 166/88   MAP (Calculated) 114

## 2025-05-09 NOTE — PROGRESS NOTES
Nurses handed pt a copy of discharge instructions,which have been read and explained to pt.New medications read and explained,pt verbalized understanding.Pt is aware prescriptions have been sent to pharmacy via electronic system.Opportunity for clarifications and questions offered.removed IV access with no complications noted,VS stable and pt discharged with all personal belongings.

## 2025-05-14 NOTE — PROGRESS NOTES
Physician Progress Note      PATIENT:               GLORY PERALTA  CSN #:                  [id number]  :                       1950  ADMIT DATE:       2025 10:50 PM  DISCH DATE:        2025 11:46 AM  RESPONDING  PROVIDER #:        Grayson Remy MD          QUERY TEXT:    Based on your medical judgment, please clarify these findings and document if   any of the following are being evaluated and/or treated:    The clinical indicators include:  - admitted w/ Afib with RVR per H&P  - Troponin 6, 66, 26, 7 per lab results  - H&P notes: \"...she developed palpitations, lightheadedness, nausea, chest   pressure and feeling \"quivering\" inside similar to prior A-fib with RVR   episode.\"  - 5/3 IM PN: \"Elevated troponin: present on admission but trending down now.    This is not NSTEMI-- rather, this was 2/2 afib with RVR.\"  Options provided:  -- Type II MI due to Afib  -- Demand ischemia due to Afib  -- Troponin not clinically significant  -- Other - I will add my own diagnosis  -- Disagree - Not applicable / Not valid  -- Disagree - Clinically unable to determine / Unknown  -- Refer to Clinical Documentation Reviewer    PROVIDER RESPONSE TEXT:    This patient has Type II MI due to Afib.    Query created by: Fartun Conway on 2025 9:23 AM      Electronically signed by:  Grayson Remy MD 2025 6:33 PM

## 2025-08-19 ENCOUNTER — TRANSCRIBE ORDERS (OUTPATIENT)
Facility: HOSPITAL | Age: 75
End: 2025-08-19

## 2025-08-19 DIAGNOSIS — M75.41 IMPINGEMENT SYNDROME OF RIGHT SHOULDER: ICD-10-CM

## 2025-08-19 DIAGNOSIS — M25.511 RIGHT SHOULDER PAIN, UNSPECIFIED CHRONICITY: ICD-10-CM

## 2025-08-19 DIAGNOSIS — M75.121 NONTRAUMATIC COMPLETE TEAR OF RIGHT ROTATOR CUFF: Primary | ICD-10-CM

## 2025-08-19 DIAGNOSIS — M19.011 ARTHRITIS OF RIGHT ACROMIOCLAVICULAR JOINT: ICD-10-CM

## 2025-08-26 ENCOUNTER — HOSPITAL ENCOUNTER (OUTPATIENT)
Facility: HOSPITAL | Age: 75
Discharge: HOME | End: 2025-08-29
Attending: ORTHOPAEDIC SURGERY
Payer: MEDICARE

## 2025-08-26 DIAGNOSIS — M25.511 RIGHT SHOULDER PAIN, UNSPECIFIED CHRONICITY: ICD-10-CM

## 2025-08-26 DIAGNOSIS — M75.121 NONTRAUMATIC COMPLETE TEAR OF RIGHT ROTATOR CUFF: ICD-10-CM

## 2025-08-26 DIAGNOSIS — M75.41 IMPINGEMENT SYNDROME OF RIGHT SHOULDER: ICD-10-CM

## 2025-08-26 DIAGNOSIS — M19.011 ARTHRITIS OF RIGHT ACROMIOCLAVICULAR JOINT: ICD-10-CM

## 2025-08-26 PROCEDURE — 73221 MRI JOINT UPR EXTREM W/O DYE: CPT

## (undated) DEVICE — FORCEP BX JUMBO 4 2.8 MMX240 CM 3.2 MM OVL CUP RADIAL JAW

## (undated) DEVICE — ORISE PROKNIFE 3.0 MM ELECTRODE: Brand: ORISE™ PROKNIFE

## (undated) DEVICE — ORISE PROKNIFE 1.5 MM ELECTRODE: Brand: ORISE™ PROKNIFE